# Patient Record
Sex: MALE | Race: WHITE | NOT HISPANIC OR LATINO | Employment: OTHER | URBAN - METROPOLITAN AREA
[De-identification: names, ages, dates, MRNs, and addresses within clinical notes are randomized per-mention and may not be internally consistent; named-entity substitution may affect disease eponyms.]

---

## 2017-01-06 ENCOUNTER — OFFICE VISIT (OUTPATIENT)
Dept: NEUROLOGY | Facility: CLINIC | Age: 47
End: 2017-01-06

## 2017-01-06 VITALS
DIASTOLIC BLOOD PRESSURE: 90 MMHG | HEIGHT: 72 IN | SYSTOLIC BLOOD PRESSURE: 132 MMHG | BODY MASS INDEX: 26.95 KG/M2 | WEIGHT: 199 LBS | OXYGEN SATURATION: 98 % | HEART RATE: 69 BPM

## 2017-01-06 DIAGNOSIS — G20 PARKINSON'S DISEASE (HCC): Primary | ICD-10-CM

## 2017-01-06 DIAGNOSIS — F33.1 MODERATE EPISODE OF RECURRENT MAJOR DEPRESSIVE DISORDER (HCC): ICD-10-CM

## 2017-01-06 PROCEDURE — 99213 OFFICE O/P EST LOW 20 MIN: CPT | Performed by: PSYCHIATRY & NEUROLOGY

## 2017-01-06 RX ORDER — ESCITALOPRAM OXALATE 10 MG/1
10 TABLET ORAL DAILY
Qty: 30 TABLET | Refills: 2 | Status: SHIPPED | OUTPATIENT
Start: 2017-01-06 | End: 2017-04-03 | Stop reason: SDUPTHER

## 2017-01-06 RX ORDER — PRAMIPEXOLE DIHYDROCHLORIDE 1 MG/1
1 TABLET ORAL 3 TIMES DAILY
Qty: 90 TABLET | Refills: 10 | Status: SHIPPED | OUTPATIENT
Start: 2017-01-06 | End: 2017-02-28 | Stop reason: SDUPTHER

## 2017-01-06 NOTE — MR AVS SNAPSHOT
Jose Luis Mandujano   1/6/2017 3:00 PM   Office Visit    Dept Phone:  [phone]   Encounter #:  [id number]    Provider:  Claus Louie MD   Department:  [facility] NEUROLOGY                Your Full Care Plan              Today's Medication Changes          These changes are accurate as of: 1/6/17  3:43 PM.  If you have any questions, ask your nurse or doctor.               New Medication(s)Ordered:     escitalopram 10 MG tablet   Commonly known as:  LEXAPRO   Take 1 tablet by mouth Daily.   Started by:  Claus Louie MD       pramipexole 1 MG tablet   Commonly known as:  MIRAPEX   Take 1 tablet by mouth 3 (Three) Times a Day.   Started by:  Claus Louie MD         Stop taking medication(s)listed here:     gabapentin 100 MG capsule   Commonly known as:  NEURONTIN   Stopped by:  Claus Louie MD                Where to Get Your Medications      These medications were sent to [facility] [address] [place] [address] - [phone] [place] [phone] [place] [address]     Phone:  [phone]     escitalopram 10 MG tablet    pramipexole 1 MG tablet                  Your Updated Medication List          This list is accurate as of: 1/6/17  3:43 PM.  Always use your most recent med list.                carbidopa-levodopa  MG per tablet   Commonly known as:  SINEMET   Take 1 tablet by mouth 3 (Three) Times a Day.       diazePAM 5 MG tablet   Commonly known as:  VALIUM       escitalopram 10 MG tablet   Commonly known as:  LEXAPRO   Take 1 tablet by mouth Daily.       pramipexole 1 MG tablet   Commonly known as:  MIRAPEX   Take 1 tablet by mouth 3 (Three) Times a Day.               You Were Diagnosed With        Codes Comments    Parkinson's disease    -  Primary ICD-10-CM: G20  ICD-9-CM: 332.0     Moderate episode of recurrent major depressive disorder     ICD-10-CM: F33.1  ICD-9-CM: 296.32       Instructions     "None    Patient Instructions History      Upcoming Appointments     Visit Type Date Time Department    FOLLOW UP 2017  3:00 PM [facility]      F[employer] Signup     [facility] Fuel[employer] allows you to send messages to your doctor, view your test results, renew your prescriptions, schedule appointments, and more. To sign up, go to [employer] and click on the Sign Up Now link in the New User? box. Enter your [employer] Activation Code exactly as it appears below along with the last four digits of your Social Security Number and your Date of Birth () to complete the sign-up process. If you do not sign up before the expiration date, you must request a new code.    [employer] Activation Code: [email or web address]  Expires: 2017  3:43 PM    If you have questions, you can email [email or web address] or call [phone] to talk to our [employer] staff. Remember, Fuel[employer]D is NOT to be used for urgent needs. For medical emergencies, dial 911.               Other Info from Your Visit           Allergies     No Known Allergies      Reason for Visit     Tremors F/U on MRI      Vital Signs     Blood Pressure Pulse Height Weight Oxygen Saturation Body Mass Index    132/90 69 72\" (182.9 cm) 199 lb (90.3 kg) 98% 26.99 kg/m2    Smoking Status                   Never Smoker           Problems and Diagnoses Noted     Mood problem    Parkinson disease        "

## 2017-01-06 NOTE — LETTER
January 6, 2017     Major Santana DO  [address] [facility] [address]    Patient: Jose Luis Mandujano   YOB: 1970   Date of Visit: 1/6/2017       Dear Dr. Esther DO:    Thank you for referring Jose Luis Mandujano to me for evaluation. Below are the relevant portions of my assessment and plan of care.         Problems Addressed this Visit        Nervous and Auditory    Parkinson's disease - Primary     Minimal response to Mirapex and Sinemet    Increase Mirapex 1 mg TID, and continue sinemet           Relevant Medications    pramipexole (MIRAPEX) 1 MG tablet       Other    Moderate episode of recurrent major depressive disorder     Psychological condition is worsening.  Continue current treatment regimen.  Psychological condition  will be reassessed at the next regular appointment.    Add Lexapro 10 mg qday         Relevant Medications    escitalopram (LEXAPRO) 10 MG tablet                 If you have questions, please do not hesitate to call me. I look forward to following Jose Luis along with you.         Sincerely,        Claus Louie MD        CC: No Recipients

## 2017-01-06 NOTE — PROGRESS NOTES
Subjective:     Patient ID: Jose Luis Mandujano is a 46 y.o. male.    History of Present Illness     45 yo male with PD returns in follow up.  Last visit on 12/15/16 ordered labs and MRI Brain and rx Mirapex.    Labs - [facility]  MRI Brain, my review, 12/21/16, normal    Telephoned on 12/29/16 reporting Mirapex not effective.  Rx sinement.      Update:    He has tolerated medications but does not feel any change in sx.  Left arm is stiff and difficult to move quickly.  Notices decreased arm swing.  Shoulder pain has improved by 10%.  Right ankle stiffness has improved.      Mood is anxious and increasing tremor on left side.      PMH:    Previously followed by Dr Baker and last seen by Dr aBker on 2/9/16 renewed propranolol.  Initial eval with left hand tremor for several months starting in late 2014.  Tremor has now spread to left leg and developing left sided limp.  Slowing movements of left arm and leg.      Reviewed evaluation by Dr Kit Kunz on 9/6/16   Action tremor in left hand with onset 4 years ago.  Noticed at first with smoking a cigarette.   Started on primidone on 9/6/16.  Stopped due to CNS side effects.      MRI C-spine, my review, 3/4/16 C6/7 disc to the left   The following portions of the patient's history were reviewed and updated as appropriate: allergies, current medications, past family history, past medical history, past social history, past surgical history and problem list.    Review of Systems   Constitutional: Positive for fatigue. Negative for activity change and unexpected weight change.   HENT: Negative for facial swelling, hearing loss, tinnitus, trouble swallowing and voice change.    Eyes: Negative for photophobia, pain and visual disturbance.   Respiratory: Negative for apnea, cough and choking.    Cardiovascular: Negative for chest pain.   Gastrointestinal: Negative for constipation, nausea and vomiting.   Endocrine: Negative for cold intolerance.   Genitourinary: Negative for  "difficulty urinating, frequency and urgency.   Musculoskeletal: Positive for gait problem. Negative for arthralgias, back pain, myalgias, neck pain and neck stiffness.   Skin: Negative for rash.   Allergic/Immunologic: Negative for immunocompromised state.   Neurological: Positive for tremors and weakness. Negative for dizziness, seizures, syncope, facial asymmetry, speech difficulty, light-headedness, numbness and headaches.   Hematological: Negative for adenopathy.   Psychiatric/Behavioral: Negative for confusion, decreased concentration, hallucinations and sleep disturbance. The patient is not nervous/anxious.         Objective:  Vitals:    01/06/17 1508   BP: 132/90   Pulse: 69   SpO2: 98%   Weight: 199 lb (90.3 kg)   Height: 72\" (182.9 cm)       Neurologic Exam     Mental Status   Oriented to person, place, and time.   Registration: recalls 3 of 3 objects. Recall at 5 minutes: recalls 3 of 3 objects. Follows 3 step commands.   Attention: normal. Concentration: normal.   Speech: speech is normal   Level of consciousness: alert  Knowledge: good and consistent with education.   Able to name object. Able to read. Able to repeat. Able to write. Normal comprehension.     Cranial Nerves     CN II   Visual fields full to confrontation.   Visual acuity: normal  Right visual field deficit: none  Left visual field deficit: none     CN III, IV, VI   Pupils are equal, round, and reactive to light.  Extraocular motions are normal.   Right pupil: Shape: regular. Reactivity: brisk. Consensual response: intact.   Left pupil: Shape: regular. Reactivity: brisk. Consensual response: intact.   Nystagmus: none   Diplopia: none  Ophthalmoparesis: none  Upgaze: normal  Downgaze: normal  Conjugate gaze: present  Vestibulo-ocular reflex: present    CN V   Facial sensation intact.   Right corneal reflex: normal  Left corneal reflex: normal    CN VII   Right facial weakness: none  Left facial weakness: none    CN VIII   Hearing: " intact    CN IX, X   Palate: symmetric  Right gag reflex: normal  Left gag reflex: normal    CN XI   Right sternocleidomastoid strength: normal  Left sternocleidomastoid strength: normal    CN XII   Tongue: not atrophic  Fasciculations: absent  Tongue deviation: none       Masked face and decreased blink     Motor Exam   Muscle bulk: normal  Overall muscle tone: normal  Right arm tone: normal  Left arm tone: normal and cogwheel rigidity  Right leg tone: normal  Left leg tone: increased    Strength   Strength 5/5 throughout.     Sensory Exam   Light touch normal.   Vibration normal.   Proprioception normal.   Pinprick normal.     Gait, Coordination, and Reflexes     Gait  Gait: (decreased arm swing on left )    Coordination   Romberg: negative  Finger to nose coordination: normal  Heel to shin coordination: normal  Tandem walking coordination: normal    Tremor   Resting tremor: present (left arm)  Intention tremor: present  Action tremor: left arm and left leg    Reflexes   Reflexes 2+ except as noted.        FABRICIO attenuated in left arm and leg.       Physical Exam   Constitutional: He is oriented to person, place, and time.   Eyes: EOM are normal. Pupils are equal, round, and reactive to light.   Neurological: He is oriented to person, place, and time. He has normal strength. He has a normal Finger-Nose-Finger Test, a normal Heel to Shin Test, a normal Romberg Test and a normal Tandem Gait Test.   Psychiatric: His speech is normal.       Assessment/Plan:       Problems Addressed this Visit        Nervous and Auditory    Parkinson's disease - Primary     Minimal response to Mirapex and Sinemet    Increase Mirapex 1 mg TID, and continue sinemet           Relevant Medications    pramipexole (MIRAPEX) 1 MG tablet       Other    Moderate episode of recurrent major depressive disorder     Psychological condition is worsening.  Continue current treatment regimen.  Psychological condition  will be reassessed at the next  regular appointment.    Add Lexapro 10 mg qday         Relevant Medications    escitalopram (LEXAPRO) 10 MG tablet

## 2017-01-06 NOTE — ASSESSMENT & PLAN NOTE
Psychological condition is worsening.  Continue current treatment regimen.  Psychological condition  will be reassessed at the next regular appointment.    Add Lexapro 10 mg qday

## 2017-01-20 ENCOUNTER — OFFICE VISIT (OUTPATIENT)
Dept: NEUROLOGY | Facility: CLINIC | Age: 47
End: 2017-01-20

## 2017-01-20 VITALS
HEIGHT: 72 IN | OXYGEN SATURATION: 98 % | SYSTOLIC BLOOD PRESSURE: 148 MMHG | HEART RATE: 57 BPM | DIASTOLIC BLOOD PRESSURE: 96 MMHG | WEIGHT: 197 LBS | BODY MASS INDEX: 26.68 KG/M2

## 2017-01-20 DIAGNOSIS — F33.1 MODERATE EPISODE OF RECURRENT MAJOR DEPRESSIVE DISORDER (HCC): Primary | ICD-10-CM

## 2017-01-20 DIAGNOSIS — G20 PARKINSON'S DISEASE (HCC): ICD-10-CM

## 2017-01-20 PROCEDURE — 99213 OFFICE O/P EST LOW 20 MIN: CPT | Performed by: PSYCHIATRY & NEUROLOGY

## 2017-01-20 RX ORDER — TRIHEXYPHENIDYL HYDROCHLORIDE 2 MG/1
2 TABLET ORAL 3 TIMES DAILY
Qty: 90 TABLET | Refills: 2 | Status: SHIPPED | OUTPATIENT
Start: 2017-01-20 | End: 2017-02-28 | Stop reason: SDUPTHER

## 2017-01-20 NOTE — MR AVS SNAPSHOT
Jose Luis Manudjano   1/20/2017 3:00 PM   Office Visit    Dept Phone:  [phone]   Encounter #:  [id number]    Provider:  Claus Louie MD   Department:  [facility] NEUROLOGY                Your Full Care Plan              Today's Medication Changes          These changes are accurate as of: 1/20/17  4:26 PM.  If you have any questions, ask your nurse or doctor.               New Medication(s)Ordered:     trihexyphenidyl 2 MG tablet   Commonly known as:  ARTANE   Take 1 tablet by mouth 3 (Three) Times a Day.   Started by:  Claus Louie MD         Stop taking medication(s)listed here:     diazePAM 5 MG tablet   Commonly known as:  VALIUM   Stopped by:  Claus Louie MD                Where to Get Your Medications      These medications were sent to [facility] [address] [place] [address] - [address] [place] [address] - [phone]  - [phone] [place] [id number]     Phone:  [phone]     trihexyphenidyl 2 MG tablet                  Your Updated Medication List          This list is accurate as of: 1/20/17  4:26 PM.  Always use your most recent med list.                carbidopa-levodopa  MG per tablet   Commonly known as:  SINEMET   Take 1 tablet by mouth 3 (Three) Times a Day.       escitalopram 10 MG tablet   Commonly known as:  LEXAPRO   Take 1 tablet by mouth Daily.       pramipexole 1 MG tablet   Commonly known as:  MIRAPEX   Take 1 tablet by mouth 3 (Three) Times a Day.       trihexyphenidyl 2 MG tablet   Commonly known as:  ARTANE   Take 1 tablet by mouth 3 (Three) Times a Day.               We Performed the Following     Ambulatory Referral to Physical Therapy Evaluate and treat       You Were Diagnosed With        Codes Comments    Moderate episode of recurrent major depressive disorder    -  Primary ICD-10-CM: F33.1  ICD-9-CM: 296.32     Parkinson's disease     ICD-10-CM: G20  ICD-9-CM: 332.0       Instructions     None    "   Patient Instructions History      Upcoming Appointments     Visit Type Date Time Department    FOLLOW UP 2017  3:00 PM [facility]    FOLLOW UP 2017  3:15 PM [facility]      MyChart Signup     [facility] Applyful allows you to send messages to your doctor, view your test results, renew your prescriptions, schedule appointments, and more. To sign up, go to [employer] and click on the Sign Up Now link in the New User? box. Enter your [employer] Activation Code exactly as it appears below along with the last four digits of your Social Security Number and your Date of Birth () to complete the sign-up process. If you do not sign up before the expiration date, you must request a new code.    [employer] Activation Code: [email or web address]  Expires: 2/3/2017  4:26 PM    If you have questions, you can email [email or web address] or call [phone] to talk to our Applyful staff. Remember, Applyful is NOT to be used for urgent needs. For medical emergencies, dial 911.               Other Info from Your Visit           Your Appointments     2017  3:15 PM EST   Follow Up with Claus Louie MD   [facility] (--)    [address] [place] [id number] [phone]           Arrive 15 minutes prior to appointment.              Allergies     No Known Allergies      Reason for Visit     Parkinson's Disease           Vital Signs     Blood Pressure Pulse Height Weight Oxygen Saturation Body Mass Index    148/96 57 72\" (182.9 cm) 197 lb (89.4 kg) 98% 26.72 kg/m2    Smoking Status                   Never Smoker           Problems and Diagnoses Noted     Mood problem    Parkinson disease        "

## 2017-01-20 NOTE — PROGRESS NOTES
Subjective:     Patient ID: Jose Luis Mandujano is a 46 y.o. male.    History of Present Illness     47 yo male with PD and MDD returns in follow up.  Last visit on 1/6/17 increased Mirapex 1 mg TID, continued Sinemet and added Lexapro, stopped GBP and Valium.     PD    Mild nausea with increased meds.  Gait improved but tremor continues to be bothersome.  Difficult to weld.  Stiff on left side with attenuated movements.  Doing PT on left shoulder with reduction in pain.      MDD    Slight improvement on Lexapro    PMH:    Previously followed by Dr Baker and last seen by Dr Baker on 2/9/16 renewed propranolol.  Initial eval with left hand tremor for several months starting in late 2014.  Tremor has now spread to left leg and developing left sided limp.  Slowing movements of left arm and leg.      Reviewed evaluation by Dr Kit Kunz on 9/6/16   Action tremor in left hand with onset 4 years ago.  Noticed at first with smoking a cigarette.   Started on primidone on 9/6/16.  Stopped due to CNS side effects.      MRI C-spine, my review, 3/4/16 C6/7 disc to the left   The following portions of the patient's history were reviewed and updated as appropriate: allergies, current medications, past family history, past medical history, past social history, past surgical history and problem list.    Review of Systems   Constitutional: Negative for activity change and unexpected weight change.   HENT: Negative for facial swelling, hearing loss, tinnitus, trouble swallowing and voice change.    Eyes: Negative for photophobia, pain and visual disturbance.   Respiratory: Negative for apnea and choking.    Gastrointestinal: Negative for constipation.   Endocrine: Negative for cold intolerance.   Genitourinary: Negative for difficulty urinating, frequency and urgency.   Musculoskeletal: Positive for gait problem. Negative for back pain and neck stiffness.   Allergic/Immunologic: Negative for immunocompromised state.   Neurological:  "Positive for tremors. Negative for dizziness, seizures, syncope, facial asymmetry, speech difficulty and light-headedness.   Hematological: Negative for adenopathy.   Psychiatric/Behavioral: Negative for confusion, decreased concentration, hallucinations and sleep disturbance. The patient is not nervous/anxious.         Objective:  Vitals:    01/20/17 1513   BP: 148/96   Pulse: 57   SpO2: 98%   Weight: 197 lb (89.4 kg)   Height: 72\" (182.9 cm)       Neurologic Exam     Mental Status   Oriented to person, place, and time.   Registration: recalls 3 of 3 objects. Recall at 5 minutes: recalls 3 of 3 objects. Follows 3 step commands.   Attention: normal. Concentration: normal.   Speech: speech is normal   Level of consciousness: alert  Knowledge: good and consistent with education.   Able to name object. Able to read. Able to repeat. Able to write. Normal comprehension.     Cranial Nerves     CN II   Visual fields full to confrontation.   Visual acuity: normal  Right visual field deficit: none  Left visual field deficit: none     CN III, IV, VI   Pupils are equal, round, and reactive to light.  Extraocular motions are normal.   Right pupil: Shape: regular. Reactivity: brisk. Consensual response: intact.   Left pupil: Shape: regular. Reactivity: brisk. Consensual response: intact.   Nystagmus: none   Diplopia: none  Ophthalmoparesis: none  Upgaze: normal  Downgaze: normal  Conjugate gaze: present  Vestibulo-ocular reflex: present    CN V   Facial sensation intact.   Right corneal reflex: normal  Left corneal reflex: normal    CN VII   Right facial weakness: none  Left facial weakness: none    CN VIII   Hearing: intact    CN IX, X   Palate: symmetric  Right gag reflex: normal  Left gag reflex: normal    CN XI   Right sternocleidomastoid strength: normal  Left sternocleidomastoid strength: normal    CN XII   Tongue: not atrophic  Fasciculations: absent  Tongue deviation: none       Masked face and decreased blink     Motor " Exam   Muscle bulk: normal  Overall muscle tone: normal  Right arm tone: normal  Left arm tone: normal and cogwheel rigidity  Right leg tone: normal  Left leg tone: increased    Strength   Strength 5/5 throughout.     Sensory Exam   Light touch normal.   Vibration normal.   Proprioception normal.   Pinprick normal.     Gait, Coordination, and Reflexes     Gait  Gait: (decreased arm swing on left )    Coordination   Romberg: negative  Finger to nose coordination: normal  Heel to shin coordination: normal  Tandem walking coordination: normal    Tremor   Resting tremor: present (left arm)  Intention tremor: present  Action tremor: left arm and left leg    Reflexes   Reflexes 2+ except as noted.        FABRICIO attenuated in left arm and leg.       Physical Exam   Constitutional: He is oriented to person, place, and time.   Eyes: EOM are normal. Pupils are equal, round, and reactive to light.   Neurological: He is oriented to person, place, and time. He has normal strength. He has a normal Finger-Nose-Finger Test, a normal Heel to Shin Test, a normal Romberg Test and a normal Tandem Gait Test.   Psychiatric: His speech is normal.       Assessment/Plan:       Problems Addressed this Visit        Nervous and Auditory    Parkinson's disease     Tremor is persistent and bothersome    Add Artane 2 mg TID  Continue Mirapex/Sinemet              Relevant Medications    trihexyphenidyl (ARTANE) 2 MG tablet       Other    Moderate episode of recurrent major depressive disorder - Primary     Psychological condition is unchanged.  Continue current treatment regimen.  Psychological condition  will be reassessed at the next regular appointment.

## 2017-01-20 NOTE — ASSESSMENT & PLAN NOTE
Psychological condition is unchanged.  Continue current treatment regimen.  Psychological condition  will be reassessed at the next regular appointment.

## 2017-01-20 NOTE — LETTER
January 20, 2017     Major Santana DO  [address] [facility] [address]    Patient: Jose Luis Mandujano   YOB: 1970   Date of Visit: 1/20/2017       Dear Dr. Esther DO:    Thank you for referring Jose Luis Mandujano to me for evaluation. Below are the relevant portions of my assessment and plan of care.         Problems Addressed this Visit        Nervous and Auditory    Parkinson's disease     Tremor is persistent and bothersome    Add Artane 2 mg TID  Continue Mirapex/Sinemet              Relevant Medications    trihexyphenidyl (ARTANE) 2 MG tablet       Other    Moderate episode of recurrent major depressive disorder - Primary     Psychological condition is unchanged.  Continue current treatment regimen.  Psychological condition  will be reassessed at the next regular appointment.                      If you have questions, please do not hesitate to call me. I look forward to following Jose Luis along with you.         Sincerely,        Claus Louie MD        CC: No Recipients

## 2017-01-31 ENCOUNTER — TRANSCRIBE ORDERS (OUTPATIENT)
Dept: PHYSICAL THERAPY | Facility: HOSPITAL | Age: 47
End: 2017-01-31

## 2017-01-31 ENCOUNTER — HOSPITAL ENCOUNTER (OUTPATIENT)
Dept: PHYSICAL THERAPY | Facility: HOSPITAL | Age: 47
Setting detail: THERAPIES SERIES
Discharge: HOME | End: 2017-01-31
Attending: PSYCHIATRY & NEUROLOGY

## 2017-01-31 DIAGNOSIS — G20 PARKINSON'S DISEASE (HCC): Primary | ICD-10-CM

## 2017-01-31 PROCEDURE — 97161 PT EVAL LOW COMPLEX 20 MIN: CPT

## 2017-01-31 PROCEDURE — 97112 NEUROMUSCULAR REEDUCATION: CPT

## 2017-02-14 ENCOUNTER — HOSPITAL ENCOUNTER (OUTPATIENT)
Dept: OCCUPATIONAL THERAPY | Facility: HOSPITAL | Age: 47
Setting detail: THERAPIES SERIES
Discharge: HOME | End: 2017-02-14
Attending: PSYCHIATRY & NEUROLOGY

## 2017-02-14 DIAGNOSIS — Z78.9 IMPAIRED MOBILITY AND ADLS: ICD-10-CM

## 2017-02-14 DIAGNOSIS — R27.8 DECREASED COORDINATION: Primary | ICD-10-CM

## 2017-02-14 DIAGNOSIS — Z74.09 IMPAIRED MOBILITY AND ADLS: ICD-10-CM

## 2017-02-14 DIAGNOSIS — R25.1 TREMOR OF LEFT HAND: ICD-10-CM

## 2017-02-14 PROCEDURE — 97165 OT EVAL LOW COMPLEX 30 MIN: CPT | Performed by: OCCUPATIONAL THERAPIST

## 2017-02-14 PROCEDURE — 97530 THERAPEUTIC ACTIVITIES: CPT | Performed by: OCCUPATIONAL THERAPIST

## 2017-02-14 NOTE — PROGRESS NOTES
Outpatient Occupational Therapy Neuro Initial Evaluation  [facility]     Patient Name: Jose Luis Mandujano  : 1970  MRN: [id number]  Today's Date: 2017      Visit Date: 2017    Patient Active Problem List   Diagnosis   • Parkinson's disease   • Moderate episode of recurrent major depressive disorder        Past Medical History   Diagnosis Date   • Benign familial tremor    • Tremor         History reviewed. No pertinent past surgical history.      Visit Dx:      ICD-10-CM ICD-9-CM   1. Decreased coordination R27.9 781.3   2. Tremor of left hand R25.1 781.0   3. Impaired mobility and ADLs Z74.09 799.89             Patient History       17 1500          History    Chief Complaint Difficulty with daily activities;Joint stiffness;Other 1 (comment)   tremor/coordination  -SK      Date Current Problem(s) Began 16  -SK      Brief Description of Current Complaint Mr. Mandujano presents to OP OT upon the recommendation of his PT. He has been experiencing L sided tremors that impact his work as a  at [employer]. Although he is right-handed, Mr. Mandujano is required to use his left hand to assist and complete many tasks left handed at work.   -SK      Patient/Caregiver Goals Return to prior level of function;Know what to do to help the symptoms  -SK      Current Tobacco Use no  -SK      Smoking Status no  -SK      Patient's Rating of General Health Good  -SK      Hand Dominance right-handed  -SK      Pain     Pain at Present 0  -SK      Pain at Best 0  -SK      Pain at Worst 0  -SK      Fall Risk Assessment    Any falls in the past year: No  -SK      Daily Activities    Primary Language English  -SK      Patient is concerned about/has problems with Coordination;Grasping objects lifting;Performing job responsibilities/community activities (work, school,;Writing/grasping items with hand(s)  -SK      Barriers to learning None  -SK      Pt Participated in POC and Goals Yes  -SK      Safety     Are you being hurt, hit, or frightened by anyone at home or in your life? No  -SK      Are you being neglected by a caregiver No  -SK        User Key  (r) = Recorded By, (t) = Taken By, (c) = Cosigned By    Initials Name Provider Type    TODD Tejeda Occupational Therapist                OT Neuro       02/14/17 1500          Subjective Comments    Subjective Comments My hand is so frustrating  -SK      Subjective Pain    Able to rate subjective pain? yes  -SK      Pre-Treatment Pain Level 0  -SK      Post-Treatment Pain Level 0  -SK      Home Living    Living Arrangements house  -SK      Home Accessibility no concerns  -SK      Living Environment Comment pt lives and works independently as a  at [employer]   -SK      Vision- Basic    Current Vision Wears glasses all the time  -SK      Sensation    Sensation WNL? WNL  -SK      Light Touch No apparent deficits  -SK      Sharp/Dull No apparent deficits  -SK      Coordination    Resting Tremor Left:;Yes  -SK      Intentional Tremor Left:;Yes  -SK      Other Coordination Observations tremor worse when anxiety increases  -SK      ROM (Range of Motion)    General ROM upper extremity range of motion deficits identified  -SK      General UE Assessment    ROM Detail wrist f/e 80/61 L   -SK      MMT (Manual Muscle Testing)    General MMT Assessment no strength deficits identified  -SK        User Key  (r) = Recorded By, (t) = Taken By, (c) = Cosigned By    Initials Name Provider Type    TODD Tejeda Occupational Therapist             Hand Therapy (last 24 hours)      Hand Eval       02/14/17 1500           Strength Right    Right  Test 1 120  -SK      Right  Test 2 110  -SK      Right  Test 3 120  -SK       Strength Average Right 116.67  -SK       Strength Left    Left  Test 1 95  -SK      Left  Test 2 92  -SK      Left  Test 3 90  -SK       Strength Average Left 92.33  -SK      Therapy  Education    Given HEP  -SK      Program New  -SK      How Provided Verbal;Demonstration;Written  -SK      Provided to Patient  -SK      Level of Understanding Teach back education performed;Verbalized;Demonstrated  -SK        User Key  (r) = Recorded By, (t) = Taken By, (c) = Cosigned By    Initials Name Provider Type    JOSELUIS Gaviria OTR Occupational Therapist                        OT Goals       02/14/17 1700 02/14/17 1500       OT Short Term Goals    STG 1  Pt will be independent with L wrist stretching protocol by 2 wks to increase flexibility for work related tasks.  -SK     STG 1 Progress  New  -SK     STG 2  Pt will be independent with L hand strengthening HEP to increase  strength during fxnl tasks.  -SK     STG 2 Progress  New  -SK     Long Term Goals    LTG 1  Pt will improve L 9HPT score by 1 second by d/c to demonstrate increased speed and accuracy w/FMC.  -SK     LTG 1 Progress  New  -SK     LTG 2  Pt will be independent with home/work mods to aid with tremor activity to improve satifsfaction with daily tasks by d/c.   -SK     LTG 2 Progress  New  -SK     Time Calculation    OT Goal Re-Cert Due Date 03/16/17  -SK 03/16/17  -SK       User Key  (r) = Recorded By, (t) = Taken By, (c) = Cosigned By    Initials Name Provider Type    TODD Tejeda Occupational Therapist                OT Assessment/Plan       02/14/17 1656          OT Assessment    Functional Limitations Performance in work activities;Performance in leisure activities  -SK      Impairments Coordination;Dexterity;Muscle strength;Range of motion  -SK      Assessment Comments OT IE completed per consult. Pt to benefit from skilled OT intervention to address L wrist extension, FMC speed/accuracy and home/activity modifications to improve functional engagement in daily tasks.   -SK      Please refer to paper survey for additional self-reported information Yes  -SK      OT Rehab Potential Good  -SK      Patient would benefit  from skilled therapy intervention Yes  -SK      OT Plan    OT Frequency 1x/week  -SK      Predicted Duration of Therapy Intervention (days/wks) 4 wks  -SK      Planned CPT's? OT EVAL: 79281;OT THER ACT EA 15 MIN: 39828PM;OT THER PROC EA 15 MIN: 93496FT;OT NEUROMUSC RE EDUCATION EA 15 MIN: 85127;OT SELF CARE/MGMT/TRAIN 15 MIN: 15238  -SK      Planned Therapy Interventions (Optional Details) home exercise program;motor coordination training;neuromuscular re-education;patient/family education;ROM (Range of Motion);strengthening;stretching  -SK      OT Plan Comments est. OT POC 1x/week X4 wks   -SK        User Key  (r) = Recorded By, (t) = Taken By, (c) = Cosigned By    Initials Name Provider Type    JOSELUIS Gaviria OTR Occupational Therapist                OT Exercises       02/14/17 1500          Exercise 1    Exercise Name 1 wrist f/e stretching; see written protocol for details  -SK      Exercise 2    Exercise Name 2 reviewed LSVT BIG! protocol previously issued by PT  -SK        User Key  (r) = Recorded By, (t) = Taken By, (c) = Cosigned By    Initials Name Provider Type    TODD Tejeda Occupational Therapist                    Outcome Measures       02/14/17 1500          9 Hole Peg    9-Hole Peg Left 22.93  -SK      9-Hole Peg Right 18.53  -SK      Purdue Pegboard    Left Hand (30 seconds) 11  -SK      Right Hand (30 seconds) 13  -SK      Both Hands (30 seconds) 8  -SK      Right + Left + Both Hands 32  -SK      Assembly (60 seconds) 23  -SK      Functional Assessment    Outcome Measure Options 9 Hole Peg;Purdue Peg Board  -SK        User Key  (r) = Recorded By, (t) = Taken By, (c) = Cosigned By    Initials Name Provider Type    JOSELUIS Gaviria OTR Occupational Therapist            Time Calculation:   OT Start Time: 1500  Total Timed Code Minutes- OT: 15 minute(s)     Therapy Charges for Today     Code Description Service Date Service Provider Modifiers Qty    07860685660  OT EVAL LOW  COMPLEXITY 3 2/14/2017 TODD Castle GO 1    [id number]  OT THERAPEUTIC ACT EA 15 MIN 2/14/2017 TODD Castle GO 1                     TODD Ventura  2/14/2017

## 2017-02-21 ENCOUNTER — HOSPITAL ENCOUNTER (OUTPATIENT)
Dept: OCCUPATIONAL THERAPY | Facility: HOSPITAL | Age: 47
Setting detail: THERAPIES SERIES
Discharge: HOME | End: 2017-02-21
Attending: PSYCHIATRY & NEUROLOGY

## 2017-02-21 DIAGNOSIS — Z74.09 IMPAIRED MOBILITY AND ADLS: ICD-10-CM

## 2017-02-21 DIAGNOSIS — Z78.9 IMPAIRED MOBILITY AND ADLS: ICD-10-CM

## 2017-02-21 DIAGNOSIS — R27.8 DECREASED COORDINATION: Primary | ICD-10-CM

## 2017-02-21 DIAGNOSIS — R25.1 TREMOR OF LEFT HAND: ICD-10-CM

## 2017-02-21 PROCEDURE — 97535 SELF CARE MNGMENT TRAINING: CPT | Performed by: OCCUPATIONAL THERAPIST

## 2017-02-21 PROCEDURE — 97112 NEUROMUSCULAR REEDUCATION: CPT | Performed by: OCCUPATIONAL THERAPIST

## 2017-02-21 NOTE — PROGRESS NOTES
Outpatient Occupational Therapy Neuro Treatment Note  [facility]     Patient Name: Jose Luis Mandujano  : 1970  MRN: [id number]  Today's Date: 2017       Visit Date: 2017    Patient Active Problem List   Diagnosis   • Parkinson's disease   • Moderate episode of recurrent major depressive disorder        Past Medical History   Diagnosis Date   • Benign familial tremor    • Tremor         No past surgical history on file.      Visit Dx:    ICD-10-CM ICD-9-CM   1. Decreased coordination R27.9 781.3   2. Tremor of left hand R25.1 781.0   3. Impaired mobility and ADLs Z74.09 799.89                             OT Goals       17 1600 17 1700 17 1500    OT Short Term Goals    STG 1 Pt will be independent with L wrist stretching protocol by 2 wks to increase flexibility for work related tasks.  -ST  Pt will be independent with L wrist stretching protocol by 2 wks to increase flexibility for work related tasks.  -SK    STG 1 Progress New  -  New  -SK    STG 2 Pt will be independent with L hand strengthening HEP to increase  strength during fxnl tasks.  -ST  Pt will be independent with L hand strengthening HEP to increase  strength during fxnl tasks.  -SK    STG 2 Progress New  -[facility]    Long Term Goals    LTG 1 Pt will improve L 9HPT score by 1 second by d/c to demonstrate increased speed and accuracy w/FMC.  -ST  Pt will improve L 9HPT score by 1 second by d/c to demonstrate increased speed and accuracy w/FMC.  -SK    LTG 1 Progress New  -  New  -SK    LTG 2 Pt will be independent with home/work mods to aid with tremor activity to improve satifsfaction with daily tasks by d/c.   -ST  Pt will be independent with home/work mods to aid with tremor activity to improve satifsfaction with daily tasks by d/c.   -SK    LTG 2 Progress New  [facility]    Time Calculation    OT Goal Re-Cert Due Date  17  -SK 17  -SK      User Key  (r) = Recorded By, (t) = Taken By, (c)  "= Cosigned By    Initials Name Provider Type    JOSELUIS Gaviria OTR Occupational Therapist     TODD Duncan Occupational Therapist                Therapy Education       02/21/17 1722    Therapy Education    Given HEP  -ST    Program New  -ST    How Provided Verbal;Demonstration;Written  -ST    Provided to Patient  -ST    Level of Understanding Teach back education performed;Verbalized;Demonstrated  -ST      User Key  (r) = Recorded By, (t) = Taken By, (c) = Cosigned By    Initials Name Provider Type    TODD Gill Occupational Therapist                    OT Exercises       02/21/17 1600          Subjective Comments    Subjective Comments pt states, \"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 yes  -ST      Pre-Treatment Pain Level 0  -ST      Post-Treatment Pain Level 0  -ST      Exercise 1    Exercise Name 1 L hand strengthening HEP using blue firm t-putty (power grasp) & see written protocol for details  -ST      Exercise 2    Exercise Name 2 power grasp strengthening using blue foam block, see written protocol  -ST      Exercise 3    Exercise Name 3 addressing tremor in varying plns (standing/sitting-LUE elevated in air then supported)  -ST      Exercise 4    Exercise Name 4 completed simulated welding task for carryover at work, focus on L wrist flexion w/pressure to aid in assist of welding task and decreasing tremor  -ST      Exercise 5    Exercise Name 5 reviewed BIG movements to aid with breaking up tremor tasks during grooming and work related activities   -ST        User Key  (r) = Recorded By, (t) = Taken By, (c) = Cosigned By    Initials Name Provider Type    TODD Gill Occupational Therapist                    Time Calculation:   OT Start Time: 1600  Total Timed Code Minutes- OT: 55 minute(s)     Therapy Charges for Today     Code Description Service Date Service Provider Modifiers Qty    72930198077 HC OT " NEUROMUSC RE EDUCATION EA 15 MIN 2/21/2017 TODD Duncan GO 3    70991335337 HC OT SELF CARE/MGMT/TRAIN EA 15 MIN 2/21/2017 TODD Duncan GO 1                    TODD Garcia  2/21/2017

## 2017-02-28 ENCOUNTER — OFFICE VISIT (OUTPATIENT)
Dept: NEUROLOGY | Facility: CLINIC | Age: 47
End: 2017-02-28

## 2017-02-28 VITALS
WEIGHT: 194.6 LBS | BODY MASS INDEX: 26.36 KG/M2 | OXYGEN SATURATION: 98 % | SYSTOLIC BLOOD PRESSURE: 132 MMHG | HEART RATE: 62 BPM | HEIGHT: 72 IN | DIASTOLIC BLOOD PRESSURE: 90 MMHG

## 2017-02-28 DIAGNOSIS — G20 PARKINSON'S DISEASE (HCC): Primary | ICD-10-CM

## 2017-02-28 DIAGNOSIS — F33.1 MODERATE EPISODE OF RECURRENT MAJOR DEPRESSIVE DISORDER (HCC): ICD-10-CM

## 2017-02-28 PROCEDURE — 99213 OFFICE O/P EST LOW 20 MIN: CPT | Performed by: PSYCHIATRY & NEUROLOGY

## 2017-02-28 RX ORDER — TRIHEXYPHENIDYL HYDROCHLORIDE 2 MG/1
4 TABLET ORAL 3 TIMES DAILY
Qty: 180 TABLET | Refills: 11 | Status: SHIPPED | OUTPATIENT
Start: 2017-02-28 | End: 2018-02-28 | Stop reason: SDUPTHER

## 2017-02-28 RX ORDER — PRAMIPEXOLE DIHYDROCHLORIDE 1.5 MG/1
1.5 TABLET ORAL 3 TIMES DAILY
Qty: 90 TABLET | Refills: 11 | Status: SHIPPED | OUTPATIENT
Start: 2017-02-28 | End: 2018-02-28 | Stop reason: SDUPTHER

## 2017-02-28 NOTE — PROGRESS NOTES
Subjective:     Patient ID: Jose Luis Mandujano is a 46 y.o. male.    History of Present Illness     47 yo male with PD and MDD returns in follow up.  Last visit on 1/20/17 continued Mirapex 1 mg TID, Sinemet and Lexapro, added Artane.     PD    Pt is taking first two dosages of medications at 3:30 am and 4:30 pm.  PT eval was unremarkable.  Currently doing OT to help with arms and hand tremors.  Left shoulder stiffness and left leg limp have resolved.     Gait improved but tremor continues to be bothersome.  Difficult to weld.       MDD    Mood improved on Lexapro    PMH:    Previously followed by Dr Baker and last seen by Dr Baker on 2/9/16 renewed propranolol.  Initial eval with left hand tremor for several months starting in late 2014.  Tremor has now spread to left leg and developing left sided limp.  Slowing movements of left arm and leg.      Reviewed evaluation by Dr Kit Kunz on 9/6/16   Action tremor in left hand with onset 4 years ago.  Noticed at first with smoking a cigarette.   Started on primidone on 9/6/16.  Stopped due to CNS side effects.      MRI C-spine, my review, 3/4/16 C6/7 disc to the left   The following portions of the patient's history were reviewed and updated as appropriate: allergies, current medications, past family history, past medical history, past social history, past surgical history and problem list.    Review of Systems   Constitutional: Positive for fatigue. Negative for activity change and unexpected weight change.   HENT: Negative for facial swelling, hearing loss, tinnitus, trouble swallowing and voice change.    Eyes: Negative for photophobia, pain and visual disturbance.   Respiratory: Negative for apnea and choking.    Gastrointestinal: Negative for constipation.   Endocrine: Negative for cold intolerance.   Genitourinary: Negative for difficulty urinating, frequency and urgency.   Musculoskeletal: Positive for gait problem. Negative for back pain and neck stiffness.  "  Allergic/Immunologic: Negative for immunocompromised state.   Neurological: Positive for tremors. Negative for dizziness, seizures, syncope, facial asymmetry, speech difficulty and light-headedness.   Hematological: Negative for adenopathy.   Psychiatric/Behavioral: Negative for confusion, decreased concentration, hallucinations and sleep disturbance. The patient is nervous/anxious.         Objective:  Vitals:    02/28/17 1503   BP: 132/90   Pulse: 62   SpO2: 98%   Weight: 194 lb 9.6 oz (88.3 kg)   Height: 72\" (182.9 cm)       Neurologic Exam     Mental Status   Oriented to person, place, and time.   Registration: recalls 3 of 3 objects. Recall at 5 minutes: recalls 3 of 3 objects. Follows 3 step commands.   Attention: normal. Concentration: normal.   Speech: speech is normal   Level of consciousness: alert  Knowledge: good and consistent with education.   Able to name object. Able to read. Able to repeat. Able to write. Normal comprehension.     Cranial Nerves     CN II   Visual fields full to confrontation.   Visual acuity: normal  Right visual field deficit: none  Left visual field deficit: none     CN III, IV, VI   Pupils are equal, round, and reactive to light.  Extraocular motions are normal.   Right pupil: Shape: regular. Reactivity: brisk. Consensual response: intact.   Left pupil: Shape: regular. Reactivity: brisk. Consensual response: intact.   Nystagmus: none   Diplopia: none  Ophthalmoparesis: none  Upgaze: normal  Downgaze: normal  Conjugate gaze: present  Vestibulo-ocular reflex: present    CN V   Facial sensation intact.   Right corneal reflex: normal  Left corneal reflex: normal    CN VII   Right facial weakness: none  Left facial weakness: none    CN VIII   Hearing: intact    CN IX, X   Palate: symmetric  Right gag reflex: normal  Left gag reflex: normal    CN XI   Right sternocleidomastoid strength: normal  Left sternocleidomastoid strength: normal    CN XII   Tongue: not " atrophic  Fasciculations: absent  Tongue deviation: none       Masked face and decreased blink     Motor Exam   Muscle bulk: normal  Overall muscle tone: normal  Right arm tone: normal  Left arm tone: normal and cogwheel rigidity  Right leg tone: normal  Left leg tone: increased    Strength   Strength 5/5 throughout.     Sensory Exam   Light touch normal.   Vibration normal.   Proprioception normal.   Pinprick normal.     Gait, Coordination, and Reflexes     Gait  Gait: (decreased arm swing on left )    Coordination   Romberg: negative  Finger to nose coordination: normal  Heel to shin coordination: normal  Tandem walking coordination: normal    Tremor   Resting tremor: present (left arm)  Intention tremor: present  Action tremor: left arm and left leg    Reflexes   Reflexes 2+ except as noted.        FABRICIO attenuated in left arm and leg.       Physical Exam   Constitutional: He is oriented to person, place, and time.   Eyes: EOM are normal. Pupils are equal, round, and reactive to light.   Neurological: He is oriented to person, place, and time. He has normal strength. He has a normal Finger-Nose-Finger Test, a normal Heel to Shin Test, a normal Romberg Test and a normal Tandem Gait Test.   Psychiatric: His speech is normal.       Assessment/Plan:       Problems Addressed this Visit        Nervous and Auditory    Parkinson's disease - Primary     Increase Mirapex 1.5 mg TID; Sinemet 25/100 2 tabs po TID, Artane 4 mg TID         Relevant Medications    trihexyphenidyl (ARTANE) 2 MG tablet    pramipexole (MIRAPEX) 1.5 MG tablet    carbidopa-levodopa (SINEMET)  MG per tablet       Other    Moderate episode of recurrent major depressive disorder     Stable on Lexapro 10 mg qday

## 2017-03-16 ENCOUNTER — HOSPITAL ENCOUNTER (OUTPATIENT)
Dept: OCCUPATIONAL THERAPY | Facility: HOSPITAL | Age: 47
Setting detail: THERAPIES SERIES
Discharge: HOME | End: 2017-03-16
Attending: PSYCHIATRY & NEUROLOGY

## 2017-03-16 DIAGNOSIS — R27.8 DECREASED COORDINATION: Primary | ICD-10-CM

## 2017-03-16 DIAGNOSIS — Z74.09 IMPAIRED MOBILITY AND ADLS: ICD-10-CM

## 2017-03-16 DIAGNOSIS — Z78.9 IMPAIRED MOBILITY AND ADLS: ICD-10-CM

## 2017-03-16 DIAGNOSIS — R25.1 TREMOR OF LEFT HAND: ICD-10-CM

## 2017-03-16 PROCEDURE — 97112 NEUROMUSCULAR REEDUCATION: CPT | Performed by: OCCUPATIONAL THERAPIST

## 2017-03-16 NOTE — THERAPY DISCHARGE NOTE
Outpatient Occupational Therapy Neuro Treatment Note/Discharge Summary  [facility]     Patient Name: Jose Luis Mandujano  : 1970  MRN: [id number]  Today's Date: 3/16/2017      Visit Date: 2017    Patient Active Problem List   Diagnosis   • Parkinson's disease   • Moderate episode of recurrent major depressive disorder        Past Medical History   Diagnosis Date   • Benign familial tremor    • Tremor         No past surgical history on file.      Visit Dx:    ICD-10-CM ICD-9-CM   1. Decreased coordination R27.9 781.3   2. Tremor of left hand R25.1 781.0   3. Impaired mobility and ADLs Z74.09 799.89              Hand Therapy (last 24 hours)      Hand Eval       17 1500           Strength Left    Left  Test 1 97  -ST      Left  Test 2 100  -ST      Left  Test 3 99  -ST       Strength Average Left 98.67  -ST      Therapy Education    Given HEP  -ST      Program New  -ST      How Provided Verbal;Demonstration  -ST      Provided to Patient  -ST      Level of Understanding Teach back education performed;Verbalized;Demonstrated  -ST        User Key  (r) = Recorded By, (t) = Taken By, (c) = Cosigned By    Initials Name Provider Type    ST Deb Ferreira, OTR Occupational Therapist                    OT Assessment/Plan       17 1705       OT Assessment    Assessment Comments OT re-assessment completed per POC; pt met all goals and is independent with HEPs and home/activity mods to aid with tremor activity. Will d/c at this time.   -ST     Please refer to paper survey for additional self-reported information Yes  -ST     OT Plan    OT Plan Comments d/c skilled OT intervention at this time; pt to continue with HEPs and home/activity mods as educated  -ST       User Key  (r) = Recorded By, (t) = Taken By, (c) = Cosigned By    Initials Name Provider Type    ST Deb Ferreira OTR Occupational Therapist                 OT Goals       17 1500       OT Short Term Goals    STG 1 Pt  will be independent with L wrist stretching protocol by 2 wks to increase flexibility for work related tasks.  -ST     STG 1 Progress Met  -ST     STG 2 Pt will be independent with L hand strengthening HEP to increase  strength during fxnl tasks.  -ST     STG 2 Progress Met  -ST     Long Term Goals    LTG 1 Pt will improve L 9HPT score by 1 second by d/c to demonstrate increased speed and accuracy w/FMC.  -ST     LTG 1 Progress Met  -ST     LTG 2 Pt will be independent with home/work mods to aid with tremor activity to improve satifsfaction with daily tasks by d/c.   -ST     LTG 2 Progress Met  -ST       User Key  (r) = Recorded By, (t) = Taken By, (c) = Cosigned By    Initials Name Provider Type    ST Deb Ferreira OTR Occupational Therapist                        OT Exercises       03/16/17 1500          Subjective Comments    Subjective Comments The tremor seems to have moved into my leg some  -ST      Subjective Pain    Able to rate subjective pain? yes  -ST      Pre-Treatment Pain Level 0  -ST      Post-Treatment Pain Level 0  -ST      Exercise 1    Exercise Name 1 OT re-assessment completed per POC; see flowsheet for details  -ST      Exercise 2    Exercise Name 2 completed scissor cutting using L hand then R hand, stabilizing with L hand on table top then suspended in air; when suspended in air, slight tremor noted, however able to control activity when elbow fully extended  -ST      Exercise 3    Exercise Name 3 completed fxnl mobility in clinic, focusing on arm swing and avoiding tremor in thumb  -ST      Exercise 4    Exercise Name 4 completed FMC tasks involving olive grabber (required to use power grasp to hold to avoid tremor); then used tweezers to retrieve small items from binq  -ST      Exercise 5    Exercise Name 5 Completed FMC task using notched pegs to place in proper orinted holes on table top then suspended in air   -ST        User Key  (r) = Recorded By, (t) = Taken By, (c) = Cosigned  By    Initials Name Provider Type    TODD Gill Occupational Therapist                    Outcome Measures       03/16/17 1500          9 Hole Peg    9-Hole Peg Left 20.58  -ST      9-Hole Peg Right 18.33  -ST        User Key  (r) = Recorded By, (t) = Taken By, (c) = Cosigned By    Initials Name Provider Type    TODD Gill Occupational Therapist            Time Calculation:   OT Start Time: 1500  Total Timed Code Minutes- OT: 58 minute(s)     Therapy Charges for Today     Code Description Service Date Service Provider Modifiers Qty    01557008421  OT NEUROMUSC RE EDUCATION EA 15 MIN 3/16/2017 TODD Duncan GO 4                OP OT Discharge Summary  Date of Discharge: 03/16/17  Reason for Discharge: All goals achieved  Outcomes Achieved: Refer to plan of care for updates on goals achieved  Discharge Destination: Home with home program        TODD Garcia  3/16/2017

## 2017-04-03 RX ORDER — ESCITALOPRAM OXALATE 10 MG/1
TABLET ORAL
Qty: 30 TABLET | Refills: 6 | Status: SHIPPED | OUTPATIENT
Start: 2017-04-03 | End: 2017-04-25 | Stop reason: SDUPTHER

## 2017-04-25 ENCOUNTER — OFFICE VISIT (OUTPATIENT)
Dept: NEUROLOGY | Facility: CLINIC | Age: 47
End: 2017-04-25

## 2017-04-25 VITALS
OXYGEN SATURATION: 98 % | WEIGHT: 204.2 LBS | HEART RATE: 62 BPM | SYSTOLIC BLOOD PRESSURE: 136 MMHG | DIASTOLIC BLOOD PRESSURE: 88 MMHG | HEIGHT: 72 IN | BODY MASS INDEX: 27.66 KG/M2

## 2017-04-25 DIAGNOSIS — G20 PARKINSON'S DISEASE (HCC): Primary | ICD-10-CM

## 2017-04-25 DIAGNOSIS — F33.1 MODERATE EPISODE OF RECURRENT MAJOR DEPRESSIVE DISORDER (HCC): ICD-10-CM

## 2017-04-25 PROCEDURE — 99213 OFFICE O/P EST LOW 20 MIN: CPT | Performed by: PSYCHIATRY & NEUROLOGY

## 2017-04-25 RX ORDER — ESCITALOPRAM OXALATE 10 MG/1
20 TABLET ORAL DAILY
Qty: 60 TABLET | Refills: 11 | Status: SHIPPED | OUTPATIENT
Start: 2017-04-25 | End: 2017-08-28

## 2017-04-25 NOTE — ASSESSMENT & PLAN NOTE
Psychological condition is improving with treatment.  Medication changes per orders.  Psychological condition  will be reassessed at the next regular appointment.

## 2017-04-25 NOTE — PROGRESS NOTES
Subjective:     Patient ID: Jose Luis Mandujano is a 46 y.o. male.    History of Present Illness     47 yo male with PD and MDD returns in follow up.  Last visit on 2/28/17 increased Mirapex 1.5 mg TID, continued Sinemet and Lexapro, Artane.     PD    Left arm tremor improved.  Left quad has pulsing sensation.  Exercising daily.    Left shoulder stiffness improved.  Left leg limp have resolved.        MDD    Mild anxiety but significant improvement in Lexapro, does have trouble in public settings.     PMH:    Previously followed by Dr Baker and last seen by Dr Baker on 2/9/16 renewed propranolol.  Initial eval with left hand tremor for several months starting in late 2014.  Tremor has now spread to left leg and developing left sided limp.  Slowing movements of left arm and leg.      Reviewed evaluation by Dr Kit Kunz on 9/6/16   Action tremor in left hand with onset 4 years ago.  Noticed at first with smoking a cigarette.   Started on primidone on 9/6/16.  Stopped due to CNS side effects.      MRI C-spine, my review, 3/4/16 C6/7 disc to the left   The following portions of the patient's history were reviewed and updated as appropriate: allergies, current medications, past family history, past medical history, past social history, past surgical history and problem list.    Review of Systems   Constitutional: Negative for activity change and unexpected weight change.   HENT: Negative for facial swelling, hearing loss, tinnitus, trouble swallowing and voice change.    Eyes: Negative for photophobia, pain and visual disturbance.   Respiratory: Negative for apnea and choking.    Gastrointestinal: Negative for constipation.   Endocrine: Negative for cold intolerance.   Genitourinary: Negative for difficulty urinating, frequency and urgency.   Musculoskeletal: Positive for gait problem. Negative for back pain and neck stiffness.   Allergic/Immunologic: Negative for immunocompromised state.   Neurological: Positive for  "tremors. Negative for dizziness, seizures, syncope, facial asymmetry, speech difficulty and light-headedness.   Hematological: Negative for adenopathy.   Psychiatric/Behavioral: Negative for confusion, decreased concentration, hallucinations and sleep disturbance. The patient is nervous/anxious.         Objective:  Vitals:    04/25/17 1505   BP: 136/88   Pulse: 62   SpO2: 98%   Weight: 204 lb 3.2 oz (92.6 kg)   Height: 72\" (182.9 cm)       Neurologic Exam     Mental Status   Oriented to person, place, and time.   Registration: recalls 3 of 3 objects. Recall at 5 minutes: recalls 3 of 3 objects. Follows 3 step commands.   Attention: normal. Concentration: normal.   Speech: speech is normal   Level of consciousness: alert  Knowledge: good and consistent with education.   Able to name object. Able to read. Able to repeat. Able to write. Normal comprehension.     Cranial Nerves     CN II   Visual fields full to confrontation.   Visual acuity: normal  Right visual field deficit: none  Left visual field deficit: none     CN III, IV, VI   Pupils are equal, round, and reactive to light.  Extraocular motions are normal.   Right pupil: Shape: regular. Reactivity: brisk. Consensual response: intact.   Left pupil: Shape: regular. Reactivity: brisk. Consensual response: intact.   Nystagmus: none   Diplopia: none  Ophthalmoparesis: none  Upgaze: normal  Downgaze: normal  Conjugate gaze: present  Vestibulo-ocular reflex: present    CN V   Facial sensation intact.   Right corneal reflex: normal  Left corneal reflex: normal    CN VII   Right facial weakness: none  Left facial weakness: none    CN VIII   Hearing: intact    CN IX, X   Palate: symmetric  Right gag reflex: normal  Left gag reflex: normal    CN XI   Right sternocleidomastoid strength: normal  Left sternocleidomastoid strength: normal    CN XII   Tongue: not atrophic  Fasciculations: absent  Tongue deviation: none       Masked face and decreased blink     Motor Exam "   Muscle bulk: normal  Overall muscle tone: normal  Right arm tone: normal  Left arm tone: normal and cogwheel rigidity  Right leg tone: normal  Left leg tone: increased    Strength   Strength 5/5 throughout.     Sensory Exam   Light touch normal.   Vibration normal.   Proprioception normal.   Pinprick normal.     Gait, Coordination, and Reflexes     Gait  Gait: (decreased arm swing on left )    Coordination   Romberg: negative  Finger to nose coordination: normal  Heel to shin coordination: normal  Tandem walking coordination: normal    Tremor   Resting tremor: present (left arm)  Intention tremor: present  Action tremor: left arm and left leg    Reflexes   Reflexes 2+ except as noted.        FABRICIO attenuated in left arm and leg.       Physical Exam   Constitutional: He is oriented to person, place, and time.   Eyes: EOM are normal. Pupils are equal, round, and reactive to light.   Neurological: He is oriented to person, place, and time. He has normal strength. He has a normal Finger-Nose-Finger Test, a normal Heel to Shin Test, a normal Romberg Test and a normal Tandem Gait Test.   Psychiatric: His speech is normal.       Assessment/Plan:       Problems Addressed this Visit        Nervous and Auditory    Parkinson's disease - Primary     Improved on Mirapex, Sinemet and Artane            Other    Moderate episode of recurrent major depressive disorder     Psychological condition is improving with treatment.  Medication changes per orders.  Psychological condition  will be reassessed at the next regular appointment.         Relevant Medications    escitalopram (LEXAPRO) 10 MG tablet

## 2017-05-03 ENCOUNTER — TELEPHONE (OUTPATIENT)
Dept: NEUROLOGY | Facility: CLINIC | Age: 47
End: 2017-05-03

## 2017-05-03 DIAGNOSIS — G20 PARKINSON'S DISEASE (HCC): Primary | ICD-10-CM

## 2017-08-08 ENCOUNTER — TELEPHONE (OUTPATIENT)
Dept: NEUROLOGY | Facility: CLINIC | Age: 47
End: 2017-08-08

## 2017-08-08 NOTE — TELEPHONE ENCOUNTER
----- Message from Aliceantonia Adorno sent at 8/8/2017  2:53 PM EDT -----  Regarding: FORGETFULNESS  Contact: [phone]  Patient states he has started forgetting things. Patient states he started texting inappropriate messages to a facebook friend. Also, patient stopped taking his antidepressant a month ago.

## 2017-08-08 NOTE — TELEPHONE ENCOUNTER
Adv pt of previous notes . Pt adv that he understood and he will give us a call back if he has any other problems

## 2017-08-28 ENCOUNTER — OFFICE VISIT (OUTPATIENT)
Dept: NEUROLOGY | Facility: CLINIC | Age: 47
End: 2017-08-28

## 2017-08-28 VITALS
WEIGHT: 204 LBS | BODY MASS INDEX: 27.63 KG/M2 | SYSTOLIC BLOOD PRESSURE: 130 MMHG | HEIGHT: 72 IN | OXYGEN SATURATION: 97 % | HEART RATE: 77 BPM | DIASTOLIC BLOOD PRESSURE: 84 MMHG

## 2017-08-28 DIAGNOSIS — G20 PARKINSON'S DISEASE (HCC): Primary | ICD-10-CM

## 2017-08-28 DIAGNOSIS — F33.1 MODERATE EPISODE OF RECURRENT MAJOR DEPRESSIVE DISORDER (HCC): ICD-10-CM

## 2017-08-28 PROCEDURE — 99213 OFFICE O/P EST LOW 20 MIN: CPT | Performed by: PSYCHIATRY & NEUROLOGY

## 2017-08-28 RX ORDER — SERTRALINE HYDROCHLORIDE 100 MG/1
100 TABLET, FILM COATED ORAL DAILY
Qty: 30 TABLET | Refills: 2 | Status: SHIPPED | OUTPATIENT
Start: 2017-08-28 | End: 2017-12-03 | Stop reason: SDUPTHER

## 2017-08-28 NOTE — ASSESSMENT & PLAN NOTE
Psychological condition is improving with treatment.  Medication changes per orders.  Psychological condition  will be reassessed at the next regular appointment     D/C lexapro due to rash and start Zoloft 100 mg qday.

## 2017-08-28 NOTE — ASSESSMENT & PLAN NOTE
Mood changes and impulsivity on Mirapex    Cognition improved with decreased Mirapex 0.75 mg TID    Continue Sinemet 25/100 2 tabs TID; Artane 4 mg TID

## 2017-08-28 NOTE — PROGRESS NOTES
Subjective:     Patient ID: Jose Luis Mandujano is a 47 y.o. male.    History of Present Illness     47 y.o.  male with PD and MDD returns in follow up.  Last visit on 4/25/17 continued  Mirapex 1.5 mg TID, Sinemet and Lexapro, Artane.     PD    Telephoned on 8/8/17 reporting memory loss and texting inappropriate messages to friend.  He had recently stopped Lexapro.  Recommended cutting mirapex in half and restarting Lexapro.     Decreased Mirapex 0.75 mg QID and decreased impulsivity.  Increased motor restlessness.   Tremor slightly returned on left arm and foot.       MDD    Mild anxiety but significant improvement in Lexapro, does have trouble in public settings. Rash on hands with restarting Lexapro.     PMH:    Previously followed by Dr Baker and last seen by Dr Baker on 2/9/16 renewed propranolol.  Initial eval with left hand tremor for several months starting in late 2014.  Tremor has now spread to left leg and developing left sided limp.  Slowing movements of left arm and leg.      Reviewed evaluation by Dr Kit Kunz on 9/6/16   Action tremor in left hand with onset 4 years ago.  Noticed at first with smoking a cigarette.   Started on primidone on 9/6/16.  Stopped due to CNS side effects.      MRI C-spine, my review, 3/4/16 C6/7 disc to the left   The following portions of the patient's history were reviewed and updated as appropriate: allergies, current medications, past family history, past medical history, past social history, past surgical history and problem list.    Review of Systems   Constitutional: Negative for activity change and unexpected weight change.   HENT: Negative for facial swelling, hearing loss, tinnitus, trouble swallowing and voice change.    Eyes: Negative for photophobia, pain and visual disturbance.   Respiratory: Negative for apnea and choking.    Gastrointestinal: Negative for constipation.   Endocrine: Negative for cold intolerance.   Genitourinary: Negative for difficulty  "urinating, frequency and urgency.   Musculoskeletal: Positive for gait problem. Negative for back pain and neck stiffness.   Allergic/Immunologic: Negative for immunocompromised state.   Neurological: Positive for tremors. Negative for dizziness, seizures, syncope, facial asymmetry, speech difficulty and light-headedness.   Hematological: Negative for adenopathy.   Psychiatric/Behavioral: Positive for behavioral problems. Negative for confusion, decreased concentration, hallucinations and sleep disturbance. The patient is nervous/anxious.         Objective:  Vitals:    08/28/17 1508   BP: 130/84   Pulse: 77   SpO2: 97%   Weight: 204 lb (92.5 kg)   Height: 72\" (182.9 cm)       Neurologic Exam     Mental Status   Oriented to person, place, and time.   Registration: recalls 3 of 3 objects. Recall at 5 minutes: recalls 3 of 3 objects. Follows 3 step commands.   Attention: normal. Concentration: normal.   Speech: speech is normal   Level of consciousness: alert  Knowledge: good and consistent with education.   Able to name object. Able to read. Able to repeat. Able to write. Normal comprehension.     Cranial Nerves     CN II   Visual fields full to confrontation.   Visual acuity: normal  Right visual field deficit: none  Left visual field deficit: none     CN III, IV, VI   Pupils are equal, round, and reactive to light.  Extraocular motions are normal.   Right pupil: Shape: regular. Reactivity: brisk. Consensual response: intact.   Left pupil: Shape: regular. Reactivity: brisk. Consensual response: intact.   Nystagmus: none   Diplopia: none  Ophthalmoparesis: none  Upgaze: normal  Downgaze: normal  Conjugate gaze: present  Vestibulo-ocular reflex: present    CN V   Facial sensation intact.   Right corneal reflex: normal  Left corneal reflex: normal    CN VII   Right facial weakness: none  Left facial weakness: none    CN VIII   Hearing: intact    CN IX, X   Palate: symmetric  Right gag reflex: normal  Left gag reflex: " normal    CN XI   Right sternocleidomastoid strength: normal  Left sternocleidomastoid strength: normal    CN XII   Tongue: not atrophic  Fasciculations: absent  Tongue deviation: none       Masked face and decreased blink     Motor Exam   Muscle bulk: normal  Overall muscle tone: normal  Right arm tone: normal  Left arm tone: normal and cogwheel rigidity  Right leg tone: normal  Left leg tone: increased    Strength   Strength 5/5 throughout.     Sensory Exam   Light touch normal.   Vibration normal.   Proprioception normal.   Pinprick normal.     Gait, Coordination, and Reflexes     Gait  Gait: (decreased arm swing on left )    Coordination   Romberg: negative  Finger to nose coordination: normal  Heel to shin coordination: normal  Tandem walking coordination: normal    Tremor   Resting tremor: present (left arm)  Intention tremor: present  Action tremor: left arm and left leg    Reflexes   Reflexes 2+ except as noted.        FABRICIO attenuated in left arm and leg.       Physical Exam   Constitutional: He is oriented to person, place, and time.   Eyes: EOM are normal. Pupils are equal, round, and reactive to light.   Neurological: He is oriented to person, place, and time. He has normal strength. He has a normal Finger-Nose-Finger Test, a normal Heel to Shin Test, a normal Romberg Test and a normal Tandem Gait Test.   Psychiatric: His speech is normal.       Assessment/Plan:       Problems Addressed this Visit        Nervous and Auditory    Parkinson's disease - Primary     Mood changes and impulsivity on Mirapex    Cognition improved with decreased Mirapex 0.75 mg TID    Continue Sinemet 25/100 2 tabs TID; Artane 4 mg TID            Other    Moderate episode of recurrent major depressive disorder     Psychological condition is improving with treatment.  Medication changes per orders.  Psychological condition  will be reassessed at the next regular appointment     D/C lexapro due to rash and start Zoloft 100 mg qday.          Relevant Medications    sertraline (ZOLOFT) 100 MG tablet

## 2017-12-04 RX ORDER — SERTRALINE HYDROCHLORIDE 100 MG/1
TABLET, FILM COATED ORAL
Qty: 30 TABLET | Refills: 2 | Status: SHIPPED | OUTPATIENT
Start: 2017-12-04 | End: 2018-02-28 | Stop reason: SDUPTHER

## 2018-02-28 ENCOUNTER — OFFICE VISIT (OUTPATIENT)
Dept: NEUROLOGY | Facility: CLINIC | Age: 48
End: 2018-02-28

## 2018-02-28 VITALS
WEIGHT: 215 LBS | SYSTOLIC BLOOD PRESSURE: 130 MMHG | BODY MASS INDEX: 29.12 KG/M2 | HEIGHT: 72 IN | DIASTOLIC BLOOD PRESSURE: 80 MMHG | HEART RATE: 78 BPM | RESPIRATION RATE: 16 BRPM | OXYGEN SATURATION: 98 %

## 2018-02-28 DIAGNOSIS — F33.1 MODERATE EPISODE OF RECURRENT MAJOR DEPRESSIVE DISORDER (HCC): ICD-10-CM

## 2018-02-28 DIAGNOSIS — G20 PARKINSON'S DISEASE (HCC): Primary | ICD-10-CM

## 2018-02-28 PROCEDURE — 99213 OFFICE O/P EST LOW 20 MIN: CPT | Performed by: PSYCHIATRY & NEUROLOGY

## 2018-02-28 RX ORDER — SERTRALINE HYDROCHLORIDE 100 MG/1
100 TABLET, FILM COATED ORAL DAILY
Qty: 90 TABLET | Refills: 3 | Status: SHIPPED | OUTPATIENT
Start: 2018-02-28 | End: 2019-08-04 | Stop reason: SDUPTHER

## 2018-02-28 RX ORDER — TRIHEXYPHENIDYL HYDROCHLORIDE 2 MG/1
4 TABLET ORAL 3 TIMES DAILY
Qty: 540 TABLET | Refills: 3 | Status: SHIPPED | OUTPATIENT
Start: 2018-02-28 | End: 2019-04-27 | Stop reason: SDUPTHER

## 2018-02-28 RX ORDER — PRAMIPEXOLE DIHYDROCHLORIDE 1.5 MG/1
1.5 TABLET ORAL 3 TIMES DAILY
Qty: 270 TABLET | Refills: 3 | Status: SHIPPED | OUTPATIENT
Start: 2018-02-28 | End: 2019-12-03 | Stop reason: SDUPTHER

## 2018-02-28 NOTE — PROGRESS NOTES
Subjective:     Patient ID: Jose Luis Mandujano is a 47 y.o. male.  Chief Complaint   Patient presents with   • Parkinson's Disease       History of Present Illness     47 y.o.  male with PD and MDD returns in follow up.  Last visit on 8/28/17 continued Mirapex 0.75 mg TID, Sinemet and Lexapro, Artane.     PD    Sinemet 25/100 1 tab 3 am, 1 tab 4:30 am; 1 tab 11:15 am; 1 tab 4 pm, 1 tab 8 pm  Mirapex 1.5 mg 3 am; 0.75 mg 4:30 am  Artane  2 mg 3 am, 2 mg 4:30 am, 2 mg 11:15 am. 2 mg 4 pm, 2 mg 8 pm    Denies acting out dreams.      Minimal tremor and bradykinesia.  Mild dyskinesias.     MDD    Mood is good and denies anxiety being an issue.      PMH:    Previously followed by Dr Baker and last seen by Dr Baker on 2/9/16 renewed propranolol.  Initial eval with left hand tremor for several months starting in late 2014.  Tremor has now spread to left leg and developing left sided limp.  Slowing movements of left arm and leg.      Reviewed evaluation by Dr Kit Kunz on 9/6/16   Action tremor in left hand with onset 4 years ago.  Noticed at first with smoking a cigarette.   Started on primidone on 9/6/16.  Stopped due to CNS side effects.      MRI C-spine, my review, 3/4/16 C6/7 disc to the left   The following portions of the patient's history were reviewed and updated as appropriate: allergies, current medications, past family history, past medical history, past social history, past surgical history and problem list.    Review of Systems   Constitutional: Negative for activity change and unexpected weight change.   HENT: Negative for facial swelling, hearing loss, tinnitus, trouble swallowing and voice change.    Eyes: Negative for photophobia, pain and visual disturbance.   Respiratory: Negative for apnea and choking.    Gastrointestinal: Negative for constipation.   Endocrine: Negative for cold intolerance.   Genitourinary: Negative for difficulty urinating, frequency and urgency.   Musculoskeletal: Positive for  "gait problem. Negative for back pain and neck stiffness.   Allergic/Immunologic: Negative for immunocompromised state.   Neurological: Positive for tremors. Negative for dizziness, seizures, syncope, facial asymmetry, speech difficulty and light-headedness.   Hematological: Negative for adenopathy.   Psychiatric/Behavioral: Positive for behavioral problems. Negative for confusion, decreased concentration, hallucinations and sleep disturbance. The patient is nervous/anxious.         Objective:  Vitals:    02/28/18 1502   BP: 130/80   BP Location: Left arm   Patient Position: Sitting   Cuff Size: Adult   Pulse: 78   Resp: 16   SpO2: 98%   Weight: 97.5 kg (215 lb)   Height: 182.9 cm (72\")       Neurologic Exam     Mental Status   Oriented to person, place, and time.   Registration: recalls 3 of 3 objects. Recall at 5 minutes: recalls 3 of 3 objects. Follows 3 step commands.   Attention: normal. Concentration: normal.   Speech: speech is normal   Level of consciousness: alert  Knowledge: good and consistent with education.   Able to name object. Able to read. Able to repeat. Able to write. Normal comprehension.     Cranial Nerves     CN II   Visual fields full to confrontation.   Visual acuity: normal  Right visual field deficit: none  Left visual field deficit: none     CN III, IV, VI   Pupils are equal, round, and reactive to light.  Extraocular motions are normal.   Right pupil: Shape: regular. Reactivity: brisk. Consensual response: intact.   Left pupil: Shape: regular. Reactivity: brisk. Consensual response: intact.   Nystagmus: none   Diplopia: none  Ophthalmoparesis: none  Upgaze: normal  Downgaze: normal  Conjugate gaze: present  Vestibulo-ocular reflex: present    CN V   Facial sensation intact.   Right corneal reflex: normal  Left corneal reflex: normal    CN VII   Right facial weakness: none  Left facial weakness: none    CN VIII   Hearing: intact    CN IX, X   Palate: symmetric  Right gag reflex: " normal  Left gag reflex: normal    CN XI   Right sternocleidomastoid strength: normal  Left sternocleidomastoid strength: normal    CN XII   Tongue: not atrophic  Fasciculations: absent  Tongue deviation: none       Masked face and decreased blink     Motor Exam   Muscle bulk: normal  Overall muscle tone: normal  Right arm tone: normal  Left arm tone: normal and cogwheel rigidity  Right leg tone: normal  Left leg tone: increased    Strength   Strength 5/5 throughout.     Sensory Exam   Light touch normal.   Vibration normal.   Proprioception normal.   Pinprick normal.     Gait, Coordination, and Reflexes     Gait  Gait: (decreased arm swing on left )    Coordination   Romberg: negative  Finger to nose coordination: normal  Heel to shin coordination: normal  Tandem walking coordination: normal    Tremor   Resting tremor: present (left arm)  Intention tremor: present  Action tremor: left arm and left leg    Reflexes   Reflexes 2+ except as noted.        FABRICIO attenuated in left arm and leg.       Physical Exam   Constitutional: He is oriented to person, place, and time.   Eyes: EOM are normal. Pupils are equal, round, and reactive to light.   Neurological: He is oriented to person, place, and time. He has normal strength. He has a normal Finger-Nose-Finger Test, a normal Heel to Shin Test, a normal Romberg Test and a normal Tandem Gait Test.   Psychiatric: His speech is normal.       Assessment/Plan:       Problems Addressed this Visit        Nervous and Auditory    Parkinson's disease - Primary     Sx stable on Sinemet, Mirapex, Artane          Relevant Medications    carbidopa-levodopa (SINEMET)  MG per tablet    pramipexole (MIRAPEX) 1.5 MG tablet    trihexyphenidyl (ARTANE) 2 MG tablet       Other    Moderate episode of recurrent major depressive disorder     Psychological condition is improving with treatment.  Continue current treatment regimen.  Psychological condition  will be reassessed at the next  regular appointment.         Relevant Medications    sertraline (ZOLOFT) 100 MG tablet

## 2018-08-27 ENCOUNTER — OFFICE VISIT (OUTPATIENT)
Dept: NEUROLOGY | Facility: CLINIC | Age: 48
End: 2018-08-27

## 2018-08-27 VITALS
SYSTOLIC BLOOD PRESSURE: 132 MMHG | HEIGHT: 72 IN | BODY MASS INDEX: 29.12 KG/M2 | RESPIRATION RATE: 16 BRPM | HEART RATE: 72 BPM | DIASTOLIC BLOOD PRESSURE: 84 MMHG | OXYGEN SATURATION: 98 % | WEIGHT: 215 LBS

## 2018-08-27 DIAGNOSIS — G20 PARKINSON'S DISEASE (HCC): Primary | ICD-10-CM

## 2018-08-27 DIAGNOSIS — F33.1 MODERATE EPISODE OF RECURRENT MAJOR DEPRESSIVE DISORDER (HCC): ICD-10-CM

## 2018-08-27 PROCEDURE — 99213 OFFICE O/P EST LOW 20 MIN: CPT | Performed by: PSYCHIATRY & NEUROLOGY

## 2018-08-27 NOTE — PROGRESS NOTES
Subjective:     Patient ID: Jose Luis Mandujano is a 48 y.o. male.  Chief Complaint   Patient presents with   • Parkinson's Disease       History of Present Illness     48 y.o.  male with PD and MDD returns in follow up.  Last visit on 2/28/17 continued Mirapex 0.75 mg TID, Sinemet and Lexapro, Artane.     PD    Sinemet 25/100 2 tab 3 am, 1 tab 11:00 am; 1 tab 4 pm, 1 tab 8 pm  Mirapex 1.5 mg 3 am; 0.75 mg 3 pm  Artane  4 mg 3 am; 2 mg 11:00 am. 2 mg 4 pm, 2 mg 8 pm    Minimal tremor and bradykinesia.  Mild dyskinesias.  Losing train of thought and decreased att/conc.     MDD    Mood is stable.      PMH:    Previously followed by Dr Baker and last seen by Dr Baker on 2/9/16 renewed propranolol.  Initial eval with left hand tremor for several months starting in late 2014.  Tremor has now spread to left leg and developing left sided limp.  Slowing movements of left arm and leg.      Reviewed evaluation by Dr Kit Kunz on 9/6/16   Action tremor in left hand with onset 4 years ago.  Noticed at first with smoking a cigarette.   Started on primidone on 9/6/16.  Stopped due to CNS side effects.      MRI C-spine, my review, 3/4/16 C6/7 disc to the left   The following portions of the patient's history were reviewed and updated as appropriate: allergies, current medications, past family history, past medical history, past social history, past surgical history and problem list.    Review of Systems   Constitutional: Negative for activity change and unexpected weight change.   HENT: Negative for facial swelling, hearing loss, tinnitus, trouble swallowing and voice change.    Eyes: Negative for photophobia, pain and visual disturbance.   Respiratory: Negative for apnea and choking.    Gastrointestinal: Negative for constipation.   Endocrine: Negative for cold intolerance.   Genitourinary: Negative for difficulty urinating, frequency and urgency.   Musculoskeletal: Positive for gait problem. Negative for back pain and neck  "stiffness.   Allergic/Immunologic: Negative for immunocompromised state.   Neurological: Positive for tremors. Negative for dizziness, seizures, syncope, facial asymmetry, speech difficulty and light-headedness.   Hematological: Negative for adenopathy.   Psychiatric/Behavioral: Positive for behavioral problems. Negative for confusion, decreased concentration, hallucinations and sleep disturbance. The patient is nervous/anxious.         Objective:  Vitals:    08/27/18 1513   BP: 132/84   BP Location: Left arm   Patient Position: Sitting   Cuff Size: Adult   Pulse: 72   Resp: 16   SpO2: 98%   Weight: 97.5 kg (215 lb)   Height: 182.9 cm (72\")       Neurologic Exam     Mental Status   Oriented to person, place, and time.   Registration: recalls 3 of 3 objects. Recall at 5 minutes: recalls 3 of 3 objects. Follows 3 step commands.   Attention: normal. Concentration: normal.   Speech: speech is normal   Level of consciousness: alert  Knowledge: good and consistent with education.   Able to name object. Able to read. Able to repeat. Able to write. Normal comprehension.     Cranial Nerves     CN II   Visual fields full to confrontation.   Visual acuity: normal  Right visual field deficit: none  Left visual field deficit: none     CN III, IV, VI   Pupils are equal, round, and reactive to light.  Extraocular motions are normal.   Right pupil: Shape: regular. Reactivity: brisk. Consensual response: intact.   Left pupil: Shape: regular. Reactivity: brisk. Consensual response: intact.   Nystagmus: none   Diplopia: none  Ophthalmoparesis: none  Upgaze: normal  Downgaze: normal  Conjugate gaze: present  Vestibulo-ocular reflex: present    CN V   Facial sensation intact.   Right corneal reflex: normal  Left corneal reflex: normal    CN VII   Right facial weakness: none  Left facial weakness: none    CN VIII   Hearing: intact    CN IX, X   Palate: symmetric  Right gag reflex: normal  Left gag reflex: normal    CN XI   Right " sternocleidomastoid strength: normal  Left sternocleidomastoid strength: normal    CN XII   Tongue: not atrophic  Fasciculations: absent  Tongue deviation: none  Masked face and decreased blink     Motor Exam   Muscle bulk: normal  Overall muscle tone: normal  Right arm tone: normal  Left arm tone: normal and cogwheel rigidity  Right leg tone: normal  Left leg tone: increased    Strength   Strength 5/5 throughout.     Sensory Exam   Light touch normal.   Vibration normal.   Proprioception normal.   Pinprick normal.     Gait, Coordination, and Reflexes     Gait  Gait: (decreased arm swing on left )    Coordination   Romberg: negative  Finger to nose coordination: normal  Heel to shin coordination: normal  Tandem walking coordination: normal    Tremor   Resting tremor: present (left arm)  Intention tremor: present  Action tremor: left arm and left leg    Reflexes   Reflexes 2+ except as noted. FABRICIO attenuated in left arm and leg.       Physical Exam   Constitutional: He is oriented to person, place, and time.   Eyes: Pupils are equal, round, and reactive to light. EOM are normal.   Neurological: He is oriented to person, place, and time. He has normal strength. He has a normal Finger-Nose-Finger Test, a normal Heel to Shin Test, a normal Romberg Test and a normal Tandem Gait Test.   Psychiatric: His speech is normal.       Assessment/Plan:       Problems Addressed this Visit        Nervous and Auditory    Parkinson's disease (CMS/HCC) - Primary     Sx controlled on Artane, Sinemet and mirapex            Other    Moderate episode of recurrent major depressive disorder (CMS/HCC)     Psychological condition is improving with treatment.  Continue current treatment regimen.  Psychological condition  will be reassessed at the next regular appointment.

## 2019-02-18 ENCOUNTER — OFFICE VISIT (OUTPATIENT)
Dept: NEUROLOGY | Facility: CLINIC | Age: 49
End: 2019-02-18

## 2019-02-18 VITALS
HEIGHT: 72 IN | BODY MASS INDEX: 29.39 KG/M2 | SYSTOLIC BLOOD PRESSURE: 138 MMHG | HEART RATE: 74 BPM | DIASTOLIC BLOOD PRESSURE: 92 MMHG | OXYGEN SATURATION: 98 % | RESPIRATION RATE: 18 BRPM | WEIGHT: 217 LBS

## 2019-02-18 DIAGNOSIS — F33.1 MODERATE EPISODE OF RECURRENT MAJOR DEPRESSIVE DISORDER (HCC): ICD-10-CM

## 2019-02-18 DIAGNOSIS — G20 PARKINSON'S DISEASE (HCC): Primary | ICD-10-CM

## 2019-02-18 PROCEDURE — 99213 OFFICE O/P EST LOW 20 MIN: CPT | Performed by: PSYCHIATRY & NEUROLOGY

## 2019-02-18 NOTE — PROGRESS NOTES
Subjective:     Patient ID: Jose Luis Mandujano is a 48 y.o. male.    Chief Complaint   Patient presents with   • Parkinson's Disease       History of Present Illness     48 y.o. RH  male with PD and MDD returns in follow up.  Last visit on 8/27/18 continued Mirapex, Sinemet and Lexapro, Artane.     PD    Sinemet 25/100 2 tab 3 am, 1 tab 11:00 am; 1 tab 4 pm, 1 tab 8 pm  Mirapex 1.5 mg 3 am; 0.75 mg 3 pm  Artane  4 mg 3 am; 2 mg 11:00 am. 2 mg 4 pm, 2 mg 8 pm    Rare left foot tremors.  Noticed when driving in snow.      Denies falls.  Some clumsiness with hands.  Mild decrease in attention and concentration.    MDD    Mood is stable.      PMH:    Previously followed by Dr Baker and last seen by Dr Baker on 2/9/16 renewed propranolol.  Initial eval with left hand tremor for several months starting in late 2014.  Tremor has now spread to left leg and developing left sided limp.  Slowing movements of left arm and leg.      Reviewed evaluation by Dr Kit Kunz on 9/6/16   Action tremor in left hand with onset 4 years ago.  Noticed at first with smoking a cigarette.   Started on primidone on 9/6/16.  Stopped due to CNS side effects.      MRI C-spine, my review, 3/4/16 C6/7 disc to the left   The following portions of the patient's history were reviewed and updated as appropriate: allergies, current medications, past family history, past medical history, past social history, past surgical history and problem list.    Review of Systems   Constitutional: Negative for activity change and unexpected weight change.   HENT: Negative for facial swelling, hearing loss, tinnitus, trouble swallowing and voice change.    Eyes: Negative for photophobia, pain and visual disturbance.   Respiratory: Negative for apnea and choking.    Gastrointestinal: Negative for constipation.   Endocrine: Negative for cold intolerance.   Genitourinary: Negative for difficulty urinating, frequency and urgency.   Musculoskeletal: Positive for gait  "problem. Negative for back pain and neck stiffness.   Allergic/Immunologic: Negative for immunocompromised state.   Neurological: Positive for tremors. Negative for dizziness, seizures, syncope, facial asymmetry, speech difficulty and light-headedness.   Hematological: Negative for adenopathy.   Psychiatric/Behavioral: Positive for behavioral problems. Negative for confusion, decreased concentration, hallucinations and sleep disturbance. The patient is nervous/anxious.         Objective:  Vitals:    02/18/19 1515   BP: 138/92   BP Location: Right arm   Patient Position: Sitting   Cuff Size: Adult   Pulse: 74   Resp: 18   SpO2: 98%   Weight: 98.4 kg (217 lb)   Height: 182.9 cm (72\")       Neurologic Exam     Mental Status   Oriented to person, place, and time.   Registration: recalls 3 of 3 objects. Recall at 5 minutes: recalls 3 of 3 objects. Follows 3 step commands.   Attention: normal. Concentration: normal.   Speech: speech is normal   Level of consciousness: alert  Knowledge: good and consistent with education.   Able to name object. Able to read. Able to repeat. Able to write. Normal comprehension.     Cranial Nerves     CN II   Visual fields full to confrontation.   Visual acuity: normal  Right visual field deficit: none  Left visual field deficit: none     CN III, IV, VI   Pupils are equal, round, and reactive to light.  Extraocular motions are normal.   Right pupil: Shape: regular. Reactivity: brisk. Consensual response: intact.   Left pupil: Shape: regular. Reactivity: brisk. Consensual response: intact.   Nystagmus: none   Diplopia: none  Ophthalmoparesis: none  Upgaze: normal  Downgaze: normal  Conjugate gaze: present  Vestibulo-ocular reflex: present    CN V   Facial sensation intact.   Right corneal reflex: normal  Left corneal reflex: normal    CN VII   Right facial weakness: none  Left facial weakness: none    CN VIII   Hearing: intact    CN IX, X   Palate: symmetric  Right gag reflex: normal  Left " gag reflex: normal    CN XI   Right sternocleidomastoid strength: normal  Left sternocleidomastoid strength: normal    CN XII   Tongue: not atrophic  Fasciculations: absent  Tongue deviation: none  Masked face and decreased blink     Motor Exam   Muscle bulk: normal  Overall muscle tone: normal  Right arm tone: normal  Left arm tone: normal and cogwheel rigidity  Right leg tone: normal  Left leg tone: increased    Strength   Strength 5/5 throughout.     Sensory Exam   Light touch normal.   Vibration normal.   Proprioception normal.   Pinprick normal.     Gait, Coordination, and Reflexes     Gait  Gait: (decreased arm swing on left )    Coordination   Romberg: negative  Finger to nose coordination: normal  Heel to shin coordination: normal  Tandem walking coordination: normal    Tremor   Resting tremor: present (left arm)  Intention tremor: present  Action tremor: left arm and left leg    Reflexes   Reflexes 2+ except as noted. FABRICIO attenuated in left arm and leg.       Physical Exam   Constitutional: He is oriented to person, place, and time.   Eyes: EOM are normal. Pupils are equal, round, and reactive to light.   Neurological: He is oriented to person, place, and time. He has normal strength. He has a normal Finger-Nose-Finger Test, a normal Heel to Shin Test, a normal Romberg Test and a normal Tandem Gait Test.   Psychiatric: His speech is normal.       Assessment/Plan:       Problems Addressed this Visit        Nervous and Auditory    Parkinson's disease (CMS/HCC) - Primary     Sx controlled on Artane, Mirapex and Sinemet             Other    Moderate episode of recurrent major depressive disorder (CMS/HCC)     Psychological condition is unchanged.  Continue current treatment regimen.  Psychological condition  will be reassessed at the next regular appointment.

## 2019-04-29 RX ORDER — TRIHEXYPHENIDYL HYDROCHLORIDE 2 MG/1
TABLET ORAL
Qty: 180 TABLET | Refills: 3 | Status: SHIPPED | OUTPATIENT
Start: 2019-04-29 | End: 2019-08-05 | Stop reason: SDUPTHER

## 2019-08-05 ENCOUNTER — TELEPHONE (OUTPATIENT)
Dept: NEUROLOGY | Facility: CLINIC | Age: 49
End: 2019-08-05

## 2019-08-05 RX ORDER — TRIHEXYPHENIDYL HYDROCHLORIDE 2 MG/1
4 TABLET ORAL 3 TIMES DAILY
Qty: 180 TABLET | Refills: 3 | Status: SHIPPED | OUTPATIENT
Start: 2019-08-05 | End: 2019-12-03 | Stop reason: SDUPTHER

## 2019-08-05 RX ORDER — SERTRALINE HYDROCHLORIDE 100 MG/1
TABLET, FILM COATED ORAL
Qty: 90 TABLET | Refills: 0 | Status: SHIPPED | OUTPATIENT
Start: 2019-08-05 | End: 2019-12-03 | Stop reason: SDUPTHER

## 2019-08-05 NOTE — TELEPHONE ENCOUNTER
----- Message from Alice Adorno sent at 8/5/2019 10:14 AM EDT -----  Regarding: REFILL MEDICATION  Contact: [phone]  [email or web address]    Patient states he is making multiple trips to the pharmacy. Some how is medication has gotten off of the 90 days cycle. Patient request we call the pharmacy and return his scripts to 90 days    Please refill sertraline 100 mg   Carbidopa-levodopa  mg  Artane 2 mg

## 2019-12-03 ENCOUNTER — OFFICE VISIT (OUTPATIENT)
Dept: NEUROLOGY | Facility: CLINIC | Age: 49
End: 2019-12-03

## 2019-12-03 VITALS
SYSTOLIC BLOOD PRESSURE: 134 MMHG | HEIGHT: 72 IN | DIASTOLIC BLOOD PRESSURE: 86 MMHG | HEART RATE: 87 BPM | WEIGHT: 218 LBS | OXYGEN SATURATION: 98 % | BODY MASS INDEX: 29.53 KG/M2

## 2019-12-03 DIAGNOSIS — F33.1 MODERATE EPISODE OF RECURRENT MAJOR DEPRESSIVE DISORDER (HCC): ICD-10-CM

## 2019-12-03 DIAGNOSIS — G20 PARKINSON'S DISEASE (HCC): Primary | ICD-10-CM

## 2019-12-03 PROCEDURE — 99213 OFFICE O/P EST LOW 20 MIN: CPT | Performed by: PSYCHIATRY & NEUROLOGY

## 2019-12-03 RX ORDER — SERTRALINE HYDROCHLORIDE 100 MG/1
100 TABLET, FILM COATED ORAL DAILY
Qty: 90 TABLET | Refills: 3 | Status: SHIPPED | OUTPATIENT
Start: 2019-12-03 | End: 2020-06-04 | Stop reason: SDUPTHER

## 2019-12-03 RX ORDER — TRIHEXYPHENIDYL HYDROCHLORIDE 2 MG/1
4 TABLET ORAL 3 TIMES DAILY
Qty: 180 TABLET | Refills: 3 | Status: SHIPPED | OUTPATIENT
Start: 2019-12-03 | End: 2019-12-03 | Stop reason: SDUPTHER

## 2019-12-03 RX ORDER — TRIHEXYPHENIDYL HYDROCHLORIDE 2 MG/1
4 TABLET ORAL 3 TIMES DAILY
Qty: 540 TABLET | Refills: 3 | Status: SHIPPED | OUTPATIENT
Start: 2019-12-03 | End: 2020-06-04 | Stop reason: SDUPTHER

## 2019-12-03 RX ORDER — PRAMIPEXOLE DIHYDROCHLORIDE 1.5 MG/1
1.5 TABLET ORAL 3 TIMES DAILY
Qty: 270 TABLET | Refills: 3 | Status: SHIPPED | OUTPATIENT
Start: 2019-12-03 | End: 2020-06-04 | Stop reason: SDUPTHER

## 2019-12-03 RX ORDER — PRAMIPEXOLE DIHYDROCHLORIDE 1.5 MG/1
1.5 TABLET ORAL 3 TIMES DAILY
Qty: 270 TABLET | Refills: 3 | Status: SHIPPED | OUTPATIENT
Start: 2019-12-03 | End: 2019-12-03 | Stop reason: SDUPTHER

## 2019-12-03 NOTE — PROGRESS NOTES
Subjective:     Patient ID: Jose Luis Mandujano is a 49 y.o. male.    Chief Complaint   Patient presents with   • Parkinson's Disease       History of Present Illness     49 y.o. RH  male with PD and MDD returns in follow up.  Last visit on 2/18/19 continued Mirapex, Sinemet and Zoloft, Artane.     PD    Sinemet 25/100 2 tab 4 am, 2 tab 1:30 pm;  1 tab 8 pm  Mirapex 1.5 mg 3 am; 0.75 mg 1:30 pm  Artane  4 mg 3 am; 4 mg 1:30 pm. 2 - 4 mg 8 pm    Denies tremors.  In am left foot will want to cramp.        Denies falls.  Some clumsiness with hands.  Mild decrease in attention and concentration.    MDD    Mood is stable on Zoloft.     PMH:    Previously followed by Dr Baker and last seen by Dr Baker on 2/9/16 renewed propranolol.  Initial eval with left hand tremor for several months starting in late 2014.  Tremor has now spread to left leg and developing left sided limp.  Slowing movements of left arm and leg.      Reviewed evaluation by Dr Kit Kunz on 9/6/16   Action tremor in left hand with onset 4 years ago.  Noticed at first with smoking a cigarette.   Started on primidone on 9/6/16.  Stopped due to CNS side effects.      MRI C-spine, my review, 3/4/16 C6/7 disc to the left   The following portions of the patient's history were reviewed and updated as appropriate: allergies, current medications, past family history, past medical history, past social history, past surgical history and problem list.    Review of Systems   Constitutional: Negative for activity change and unexpected weight change.   HENT: Negative for facial swelling, hearing loss, tinnitus, trouble swallowing and voice change.    Eyes: Negative for photophobia, pain and visual disturbance.   Respiratory: Negative for apnea and choking.    Gastrointestinal: Negative for constipation.   Endocrine: Negative for cold intolerance.   Genitourinary: Negative for difficulty urinating, frequency and urgency.   Musculoskeletal: Positive for gait problem.  "Negative for back pain and neck stiffness.   Allergic/Immunologic: Negative for immunocompromised state.   Neurological: Positive for tremors. Negative for dizziness, seizures, syncope, facial asymmetry, speech difficulty and light-headedness.   Hematological: Negative for adenopathy.   Psychiatric/Behavioral: Positive for behavioral problems. Negative for confusion, decreased concentration, hallucinations and sleep disturbance. The patient is nervous/anxious.         Objective:  Vitals:    12/03/19 1502   BP: 134/86   Pulse: 87   SpO2: 98%   Weight: 98.9 kg (218 lb)   Height: 182.9 cm (72\")       Neurologic Exam     Mental Status   Oriented to person, place, and time.   Registration: recalls 3 of 3 objects. Recall at 5 minutes: recalls 3 of 3 objects. Follows 3 step commands.   Attention: normal. Concentration: normal.   Speech: speech is normal   Level of consciousness: alert  Knowledge: good and consistent with education.   Able to name object. Able to read. Able to repeat. Able to write. Normal comprehension.     Cranial Nerves     CN II   Visual fields full to confrontation.   Visual acuity: normal  Right visual field deficit: none  Left visual field deficit: none     CN III, IV, VI   Pupils are equal, round, and reactive to light.  Extraocular motions are normal.   Right pupil: Shape: regular. Reactivity: brisk. Consensual response: intact.   Left pupil: Shape: regular. Reactivity: brisk. Consensual response: intact.   Nystagmus: none   Diplopia: none  Ophthalmoparesis: none  Upgaze: normal  Downgaze: normal  Conjugate gaze: present  Vestibulo-ocular reflex: present    CN V   Facial sensation intact.   Right corneal reflex: normal  Left corneal reflex: normal    CN VII   Right facial weakness: none  Left facial weakness: none    CN VIII   Hearing: intact    CN IX, X   Palate: symmetric  Right gag reflex: normal  Left gag reflex: normal    CN XI   Right sternocleidomastoid strength: normal  Left " sternocleidomastoid strength: normal    CN XII   Tongue: not atrophic  Fasciculations: absent  Tongue deviation: none  Masked face and decreased blink     Motor Exam   Muscle bulk: normal  Overall muscle tone: normal  Right arm tone: normal  Left arm tone: normal and cogwheel rigidity  Right leg tone: normal  Left leg tone: increased    Strength   Strength 5/5 throughout.     Sensory Exam   Light touch normal.   Vibration normal.   Proprioception normal.   Pinprick normal.     Gait, Coordination, and Reflexes     Gait  Gait: (decreased arm swing on left )    Coordination   Romberg: negative  Finger to nose coordination: normal  Heel to shin coordination: normal  Tandem walking coordination: normal    Tremor   Resting tremor: present (left arm)  Intention tremor: present  Action tremor: left arm and left leg    Reflexes   Reflexes 2+ except as noted. FABRICIO attenuated in left arm and leg.       Physical Exam   Constitutional: He is oriented to person, place, and time.   Eyes: EOM are normal. Pupils are equal, round, and reactive to light.   Neurological: He is oriented to person, place, and time. He has normal strength. He has a normal Finger-Nose-Finger Test, a normal Heel to Shin Test, a normal Romberg Test and a normal Tandem Gait Test.   Psychiatric: His speech is normal.       Assessment/Plan:       Problems Addressed this Visit        Nervous and Auditory    Parkinson's disease (CMS/HCC) - Primary     Sx controlled on Mirapex, Sinemet, Artane             Other    Moderate episode of recurrent major depressive disorder (CMS/HCC)     Psychological condition is improving with treatment.  Continue current treatment regimen.  Psychological condition  will be reassessed at the next regular appointment.

## 2020-06-04 ENCOUNTER — OFFICE VISIT (OUTPATIENT)
Dept: NEUROLOGY | Facility: CLINIC | Age: 50
End: 2020-06-04

## 2020-06-04 VITALS
OXYGEN SATURATION: 98 % | HEIGHT: 72 IN | DIASTOLIC BLOOD PRESSURE: 88 MMHG | BODY MASS INDEX: 27.9 KG/M2 | SYSTOLIC BLOOD PRESSURE: 159 MMHG | HEART RATE: 77 BPM | WEIGHT: 206 LBS | TEMPERATURE: 97.2 F

## 2020-06-04 DIAGNOSIS — F33.1 MODERATE EPISODE OF RECURRENT MAJOR DEPRESSIVE DISORDER (HCC): ICD-10-CM

## 2020-06-04 DIAGNOSIS — G20 PARKINSON'S DISEASE (HCC): Primary | ICD-10-CM

## 2020-06-04 PROCEDURE — 99213 OFFICE O/P EST LOW 20 MIN: CPT | Performed by: PSYCHIATRY & NEUROLOGY

## 2020-06-04 RX ORDER — SERTRALINE HYDROCHLORIDE 100 MG/1
100 TABLET, FILM COATED ORAL DAILY
Qty: 90 TABLET | Refills: 3 | Status: SHIPPED | OUTPATIENT
Start: 2020-06-04 | End: 2021-06-15 | Stop reason: SDUPTHER

## 2020-06-04 RX ORDER — TRIHEXYPHENIDYL HYDROCHLORIDE 2 MG/1
4 TABLET ORAL 3 TIMES DAILY
Qty: 540 TABLET | Refills: 3 | Status: SHIPPED | OUTPATIENT
Start: 2020-06-04 | End: 2021-06-15 | Stop reason: SDUPTHER

## 2020-06-04 RX ORDER — PRAMIPEXOLE DIHYDROCHLORIDE 1.5 MG/1
1.5 TABLET ORAL 3 TIMES DAILY
Qty: 270 TABLET | Refills: 3 | Status: SHIPPED | OUTPATIENT
Start: 2020-06-04 | End: 2021-06-15

## 2020-06-04 NOTE — PROGRESS NOTES
Subjective:     Patient ID: Jose Luis Mandujano is a 49 y.o. male.    Chief Complaint   Patient presents with   • Parkinson's Disease     6 month follow up        History of Present Illness     49 y.o. RH  male with PD and MDD returns in follow up.  Last visit on 12/3/19 continued Mirapex, Sinemet and Zoloft, Artane.     PD    Sinemet 25/100 2 tab 4 am, 2 tab 1:30 pm;  1 tab 8 pm  Mirapex 1.5 mg 3 am; 0.75 mg 1:30 pm  Artane  4 mg 3 am; 4 mg 1:30 pm. 2 - 4 mg 8 pm    If holding a drink or driving will have drift of left hand.    Working full time without limitations.     Denies tremors.         MDD    Mood is stable on Zoloft.     PMH:    Previously followed by Dr Baker and last seen by Dr Baker on 2/9/16 renewed propranolol.  Initial eval with left hand tremor for several months starting in late 2014.  Tremor has now spread to left leg and developing left sided limp.  Slowing movements of left arm and leg.      Reviewed evaluation by Dr Kit Kunz on 9/6/16   Action tremor in left hand with onset 4 years ago.  Noticed at first with smoking a cigarette.   Started on primidone on 9/6/16.  Stopped due to CNS side effects.      MRI C-spine, my review, 3/4/16 C6/7 disc to the left   The following portions of the patient's history were reviewed and updated as appropriate: allergies, current medications, past family history, past medical history, past social history, past surgical history and problem list.    Review of Systems   Constitutional: Negative for activity change and unexpected weight change.   HENT: Negative for facial swelling, hearing loss, tinnitus, trouble swallowing and voice change.    Eyes: Negative for photophobia, pain and visual disturbance.   Respiratory: Negative for apnea and choking.    Gastrointestinal: Negative for constipation.   Endocrine: Negative for cold intolerance.   Genitourinary: Negative for difficulty urinating, frequency and urgency.   Musculoskeletal: Positive for gait problem.  "Negative for back pain and neck stiffness.   Allergic/Immunologic: Negative for immunocompromised state.   Neurological: Positive for tremors. Negative for dizziness, seizures, syncope, facial asymmetry, speech difficulty and light-headedness.   Hematological: Negative for adenopathy.   Psychiatric/Behavioral: Positive for behavioral problems. Negative for confusion, decreased concentration, hallucinations and sleep disturbance. The patient is nervous/anxious.         Objective:  Vitals:    06/04/20 1511   BP: 159/88   Pulse: 77   Temp: 97.2 °F (36.2 °C)   SpO2: 98%   Weight: 93.4 kg (206 lb)   Height: 182.9 cm (72\")       Neurologic Exam     Mental Status   Oriented to person, place, and time.   Registration: recalls 3 of 3 objects. Recall at 5 minutes: recalls 3 of 3 objects. Follows 3 step commands.   Attention: normal. Concentration: normal.   Speech: speech is normal   Level of consciousness: alert  Knowledge: good and consistent with education.   Able to name object. Able to read. Able to repeat. Able to write. Normal comprehension.     Cranial Nerves     CN II   Visual fields full to confrontation.   Visual acuity: normal  Right visual field deficit: none  Left visual field deficit: none     CN III, IV, VI   Pupils are equal, round, and reactive to light.  Extraocular motions are normal.   Right pupil: Shape: regular. Reactivity: brisk. Consensual response: intact.   Left pupil: Shape: regular. Reactivity: brisk. Consensual response: intact.   Nystagmus: none   Diplopia: none  Ophthalmoparesis: none  Upgaze: normal  Downgaze: normal  Conjugate gaze: present  Vestibulo-ocular reflex: present    CN V   Facial sensation intact.   Right corneal reflex: normal  Left corneal reflex: normal    CN VII   Right facial weakness: none  Left facial weakness: none    CN VIII   Hearing: intact    CN IX, X   Palate: symmetric  Right gag reflex: normal  Left gag reflex: normal    CN XI   Right sternocleidomastoid strength: " normal  Left sternocleidomastoid strength: normal    CN XII   Tongue: not atrophic  Fasciculations: absent  Tongue deviation: none  Masked face and decreased blink     Motor Exam   Muscle bulk: normal  Overall muscle tone: normal  Right arm tone: normal  Left arm tone: normal and cogwheel rigidity  Right leg tone: normal  Left leg tone: increased    Strength   Strength 5/5 throughout.     Sensory Exam   Light touch normal.   Vibration normal.   Proprioception normal.   Pinprick normal.     Gait, Coordination, and Reflexes     Gait  Gait: (decreased arm swing on left )    Coordination   Romberg: negative  Finger to nose coordination: normal  Heel to shin coordination: normal  Tandem walking coordination: normal    Tremor   Resting tremor: present (left arm)  Intention tremor: present  Action tremor: left arm and left leg    Reflexes   Reflexes 2+ except as noted. FABRICIO attenuated in left arm and leg.       Physical Exam   Constitutional: He is oriented to person, place, and time.   Eyes: Pupils are equal, round, and reactive to light. EOM are normal.   Neurological: He is oriented to person, place, and time. He has normal strength. He has a normal Finger-Nose-Finger Test, a normal Heel to Shin Test, a normal Romberg Test and a normal Tandem Gait Test.   Psychiatric: His speech is normal.       Assessment/Plan:       Problems Addressed this Visit        Nervous and Auditory    Parkinson's disease (CMS/HCC) - Primary     Sx stable on Artane, Sinemet, Mirapex.          Relevant Medications    trihexyphenidyl (ARTANE) 2 MG tablet    pramipexole (MIRAPEX) 1.5 MG tablet    carbidopa-levodopa (SINEMET)  MG per tablet       Other    Moderate episode of recurrent major depressive disorder (CMS/HCC)     Psychological condition is improving with treatment.  Continue current treatment regimen.  Psychological condition  will be reassessed at the next regular appointment.         Relevant Medications    sertraline (ZOLOFT) 100  MG tablet

## 2020-12-08 ENCOUNTER — OFFICE VISIT (OUTPATIENT)
Dept: NEUROLOGY | Facility: CLINIC | Age: 50
End: 2020-12-08

## 2020-12-08 VITALS
HEART RATE: 82 BPM | TEMPERATURE: 97.1 F | BODY MASS INDEX: 28.89 KG/M2 | OXYGEN SATURATION: 99 % | WEIGHT: 213 LBS | SYSTOLIC BLOOD PRESSURE: 130 MMHG | DIASTOLIC BLOOD PRESSURE: 76 MMHG

## 2020-12-08 DIAGNOSIS — F33.1 MODERATE EPISODE OF RECURRENT MAJOR DEPRESSIVE DISORDER (HCC): ICD-10-CM

## 2020-12-08 DIAGNOSIS — G20 PARKINSON'S DISEASE (HCC): Primary | ICD-10-CM

## 2020-12-08 PROCEDURE — 99213 OFFICE O/P EST LOW 20 MIN: CPT | Performed by: PSYCHIATRY & NEUROLOGY

## 2020-12-08 NOTE — PROGRESS NOTES
Subjective:     Patient ID: Jose Luis Mandujano is a 50 y.o. male.    Chief Complaint   Patient presents with   • Parkinson's Disease       History of Present Illness     50 y.o. RH  male with PD and MDD returns in follow up.  Last visit on 6/4/20 continued Mirapex, Sinemet, Zoloft, Artane.     PD    Sinemet 25/100 2 tab 4 am, 2 tab 1:30 pm;  1 tab 8 pm  Mirapex 1.5 mg 3 am; 0.75 mg 1:30 pm  Artane  4 mg 3 am; 4 mg 1:30 pm. 2 - 4 mg 8 pm    No new or worsening sx.      Working full time without limitations.     Denies tremors.       MDD    Mood is stable on Zoloft.     PMH:    Previously followed by Dr Baker and last seen by Dr Baker on 2/9/16 renewed propranolol.  Initial eval with left hand tremor for several months starting in late 2014.  Tremor has now spread to left leg and developing left sided limp.  Slowing movements of left arm and leg.      Reviewed evaluation by Dr Kit Kunz on 9/6/16   Action tremor in left hand with onset 4 years ago.  Noticed at first with smoking a cigarette.   Started on primidone on 9/6/16.  Stopped due to CNS side effects.      MRI C-spine, my review, 3/4/16 C6/7 disc to the left     The following portions of the patient's history were reviewed and updated as appropriate: allergies, current medications, past family history, past medical history, past social history, past surgical history and problem list.    Review of Systems   Constitutional: Negative for activity change and unexpected weight change.   HENT: Negative for facial swelling, hearing loss, tinnitus, trouble swallowing and voice change.    Eyes: Negative for photophobia, pain and visual disturbance.   Respiratory: Negative for apnea and choking.    Gastrointestinal: Negative for constipation.   Endocrine: Negative for cold intolerance.   Genitourinary: Negative for difficulty urinating, frequency and urgency.   Musculoskeletal: Positive for gait problem. Negative for back pain and neck stiffness.    Allergic/Immunologic: Negative for immunocompromised state.   Neurological: Positive for tremors. Negative for dizziness, seizures, syncope, facial asymmetry, speech difficulty and light-headedness.   Hematological: Negative for adenopathy.   Psychiatric/Behavioral: Positive for behavioral problems. Negative for confusion, decreased concentration, hallucinations and sleep disturbance. The patient is nervous/anxious.         Objective:  Vitals:    12/08/20 1522   BP: 130/76   Pulse: 82   Temp: 97.1 °F (36.2 °C)   SpO2: 99%   Weight: 96.6 kg (213 lb)       Neurologic Exam     Mental Status   Oriented to person, place, and time.   Registration: recalls 3 of 3 objects. Recall at 5 minutes: recalls 3 of 3 objects. Follows 3 step commands.   Attention: normal. Concentration: normal.   Speech: speech is normal   Level of consciousness: alert  Knowledge: good and consistent with education.   Able to name object. Able to read. Able to repeat. Able to write. Normal comprehension.     Cranial Nerves     CN II   Visual fields full to confrontation.   Visual acuity: normal  Right visual field deficit: none  Left visual field deficit: none     CN III, IV, VI   Pupils are equal, round, and reactive to light.  Extraocular motions are normal.   Right pupil: Shape: regular. Reactivity: brisk. Consensual response: intact.   Left pupil: Shape: regular. Reactivity: brisk. Consensual response: intact.   Nystagmus: none   Diplopia: none  Ophthalmoparesis: none  Upgaze: normal  Downgaze: normal  Conjugate gaze: present  Vestibulo-ocular reflex: present    CN V   Facial sensation intact.   Right corneal reflex: normal  Left corneal reflex: normal    CN VII   Right facial weakness: none  Left facial weakness: none    CN VIII   Hearing: intact    CN IX, X   Palate: symmetric  Right gag reflex: normal  Left gag reflex: normal    CN XI   Right sternocleidomastoid strength: normal  Left sternocleidomastoid strength: normal    CN XII   Tongue:  not atrophic  Fasciculations: absent  Tongue deviation: none  Masked face and decreased blink     Motor Exam   Muscle bulk: normal  Overall muscle tone: normal  Right arm tone: normal  Left arm tone: normal and cogwheel rigidity  Right leg tone: normal  Left leg tone: increased    Strength   Strength 5/5 throughout.     Sensory Exam   Light touch normal.   Vibration normal.   Proprioception normal.   Pinprick normal.     Gait, Coordination, and Reflexes     Gait  Gait: (decreased arm swing on left )    Coordination   Romberg: negative  Finger to nose coordination: normal  Heel to shin coordination: normal  Tandem walking coordination: normal    Tremor   Resting tremor: present (left arm)  Intention tremor: present  Action tremor: left arm and left leg    Reflexes   Reflexes 2+ except as noted. FABRICIO attenuated in left arm and leg.       Physical Exam   Constitutional: He is oriented to person, place, and time.   Eyes: Pupils are equal, round, and reactive to light. EOM are normal.   Neurological: He is oriented to person, place, and time. He has normal strength. He has a normal Finger-Nose-Finger Test, a normal Heel to Shin Test, a normal Romberg Test and a normal Tandem Gait Test.   Psychiatric: His speech is normal.       Assessment/Plan:       Problems Addressed this Visit        Nervous and Auditory    Parkinson's disease (CMS/HCC) - Primary     Sx stable on Sinemet, Artane, Mirapex             Other    Moderate episode of recurrent major depressive disorder (CMS/HCC)     Psychological condition is improving with treatment.  Continue current treatment regimen.  Psychological condition  will be reassessed at the next regular appointment.           Diagnoses       Codes Comments    Parkinson's disease (CMS/HCC)    -  Primary ICD-10-CM: G20  ICD-9-CM: 332.0     Moderate episode of recurrent major depressive disorder (CMS/HCC)     ICD-10-CM: F33.1  ICD-9-CM: 296.32

## 2021-06-15 ENCOUNTER — OFFICE VISIT (OUTPATIENT)
Dept: NEUROLOGY | Facility: CLINIC | Age: 51
End: 2021-06-15

## 2021-06-15 VITALS
SYSTOLIC BLOOD PRESSURE: 130 MMHG | HEIGHT: 72 IN | HEART RATE: 77 BPM | OXYGEN SATURATION: 96 % | RESPIRATION RATE: 18 BRPM | BODY MASS INDEX: 27.66 KG/M2 | DIASTOLIC BLOOD PRESSURE: 90 MMHG | WEIGHT: 204.2 LBS

## 2021-06-15 DIAGNOSIS — F33.1 MODERATE EPISODE OF RECURRENT MAJOR DEPRESSIVE DISORDER (HCC): Chronic | ICD-10-CM

## 2021-06-15 DIAGNOSIS — G20 PARKINSON'S DISEASE (HCC): Primary | Chronic | ICD-10-CM

## 2021-06-15 PROCEDURE — 99214 OFFICE O/P EST MOD 30 MIN: CPT | Performed by: PSYCHIATRY & NEUROLOGY

## 2021-06-15 RX ORDER — TRIHEXYPHENIDYL HYDROCHLORIDE 2 MG/1
4 TABLET ORAL 3 TIMES DAILY
Qty: 540 TABLET | Refills: 3 | Status: SHIPPED | OUTPATIENT
Start: 2021-06-15 | End: 2021-11-18

## 2021-06-15 RX ORDER — PRAMIPEXOLE DIHYDROCHLORIDE 1.5 MG/1
1.5 TABLET ORAL 3 TIMES DAILY
COMMUNITY
End: 2021-06-15 | Stop reason: SDUPTHER

## 2021-06-15 RX ORDER — SERTRALINE HYDROCHLORIDE 100 MG/1
100 TABLET, FILM COATED ORAL DAILY
Qty: 90 TABLET | Refills: 3 | Status: SHIPPED | OUTPATIENT
Start: 2021-06-15 | End: 2022-08-08 | Stop reason: SDUPTHER

## 2021-06-15 RX ORDER — PRAMIPEXOLE DIHYDROCHLORIDE 1.5 MG/1
1.5 TABLET ORAL 2 TIMES DAILY
Qty: 180 TABLET | Refills: 3 | Status: SHIPPED | OUTPATIENT
Start: 2021-06-15 | End: 2021-09-01 | Stop reason: DRUGHIGH

## 2021-06-15 NOTE — PROGRESS NOTES
"Chief Complaint  Follow-up (6 month)    Subjective          Jose Luis Mandujano presents to [facility] NEUROLOGY       History of Present Illness    51 y.o. male returns in follow up.  Last visit on 12/8/2020 continued Mirapex, Sinemet, Zoloft, Artane.     PD     Sinemet 25/100 2 tab 4 am, 2 tab 1:30 pm;  1 tab 8 pm  Mirapex 1.5 mg 3 am; 1.5 mg 1:30 pm  Artane  4 mg 3 am; 4 mg 1:30 pm. 2 - 4 mg 8 pm     Missing dosages left foot will cramp.       Working full time without limitations.      Denies tremors.       Few episodes of depersonalization.     MDD     Mood is stable on Zoloft.      PMH:     Previously followed by Dr Baker and last seen by Dr Baker on 2/9/16 renewed propranolol.  Initial eval with left hand tremor for several months starting in late 2014.  Tremor has now spread to left leg and developing left sided limp.  Slowing movements of left arm and leg.       Reviewed evaluation by Dr Kit Kunz on 9/6/16   Action tremor in left hand with onset 4 years ago.  Noticed at first with smoking a cigarette.   Started on primidone on 9/6/16.  Stopped due to CNS side effects.       MRI C-spine, my review, 3/4/16 C6/7 disc to the left   Objective   Vital Signs:   /90   Pulse 77   Resp 18   Ht 182.9 cm (72.01\")   Wt 92.6 kg (204 lb 3.2 oz)   SpO2 96%   BMI 27.69 kg/m²     Physical Exam  Eyes:      Extraocular Movements: EOM normal.      Pupils: Pupils are equal, round, and reactive to light.   Neurological:      Mental Status: He is oriented to person, place, and time.      Coordination: Finger-Nose-Finger Test, Heel to Shin Test and Romberg Test normal.      Gait: Tandem walk normal.      Deep Tendon Reflexes: Strength normal.   Psychiatric:         Speech: Speech normal.          Neurologic Exam     Mental Status   Oriented to person, place, and time.   Registration: recalls 3 of 3 objects. Recall at 5 minutes: recalls 3 of 3 objects. Follows 3 step commands.   Attention: normal. " Concentration: normal.   Speech: speech is normal   Level of consciousness: alert  Knowledge: good and consistent with education.   Able to name object. Able to read. Able to repeat. Able to write. Normal comprehension.     Cranial Nerves     CN II   Visual fields full to confrontation.   Visual acuity: normal  Right visual field deficit: none  Left visual field deficit: none     CN III, IV, VI   Pupils are equal, round, and reactive to light.  Extraocular motions are normal.   Right pupil: Shape: regular. Reactivity: brisk. Consensual response: intact.   Left pupil: Shape: regular. Reactivity: brisk. Consensual response: intact.   Nystagmus: none   Diplopia: none  Ophthalmoparesis: none  Upgaze: normal  Downgaze: normal  Conjugate gaze: present  Vestibulo-ocular reflex: present    CN V   Facial sensation intact.   Right corneal reflex: normal  Left corneal reflex: normal    CN VII   Right facial weakness: none  Left facial weakness: none    CN VIII   Hearing: intact    CN IX, X   Palate: symmetric  Right gag reflex: normal  Left gag reflex: normal    CN XI   Right sternocleidomastoid strength: normal  Left sternocleidomastoid strength: normal    CN XII   Tongue: not atrophic  Fasciculations: absent  Tongue deviation: none  Masked face and decreased blink     Motor Exam   Muscle bulk: normal  Overall muscle tone: normal  Right arm tone: normal  Left arm tone: normal and cogwheel rigidity  Right leg tone: normal  Left leg tone: increased    Strength   Strength 5/5 throughout.     Sensory Exam   Light touch normal.   Vibration normal.   Proprioception normal.   Pinprick normal.     Gait, Coordination, and Reflexes     Gait  Gait: (decreased arm swing on left )    Coordination   Romberg: negative  Finger to nose coordination: normal  Heel to shin coordination: normal  Tandem walking coordination: normal    Tremor   Resting tremor: present (left arm)  Intention tremor: present  Action tremor: left arm and left  leg    Reflexes   Reflexes 2+ except as noted. FABRICIO attenuated in left arm and leg.      Result Review :                 Assessment and Plan    Diagnoses and all orders for this visit:    1. Parkinson's disease (CMS/[facility]) (Primary)  Assessment & Plan:  Sx stable     Continue Mirapex, Sinemet, Artane       2. Moderate episode of recurrent major depressive disorder (CMS/[facility])  Assessment & Plan:  Patient's depression is recurrent and is mild without psychosis. Their depression is currently in partial remission and the condition is unchanged. This will be reassessed at the next regular appointment. F/U as described:patient will continue current medication therapy.        Follow Up   No follow-ups on file.  Patient was given instructions and counseling regarding his condition or for health maintenance advice. Please see specific information pulled into the AVS if appropriate.

## 2021-06-15 NOTE — ASSESSMENT & PLAN NOTE
Patient's depression is recurrent and is mild without psychosis. Their depression is currently in partial remission and the condition is unchanged. This will be reassessed at the next regular appointment. F/U as described:patient will continue current medication therapy.

## 2021-08-04 NOTE — ASSESSMENT & PLAN NOTE
Minimal response to Mirapex and Sinemet    Increase Mirapex 1 mg TID, and continue sinemet     Detail Level: Detailed Render Post-Care Instructions In Note?: no Post-Care Instructions: I reviewed with the patient in detail post-care instructions. Patient is to wear sunprotection, and avoid picking at any of the treated lesions. Pt may apply Vaseline to crusted or scabbing areas. Duration Of Freeze Thaw-Cycle (Seconds): 0 Show Aperture Variable?: Yes Consent: The patient's consent was obtained including but not limited to risks of crusting, scabbing, blistering, scarring, darker or lighter pigmentary change, recurrence, incomplete removal and infection.

## 2021-08-31 ENCOUNTER — TELEPHONE (OUTPATIENT)
Dept: NEUROLOGY | Facility: CLINIC | Age: 51
End: 2021-08-31

## 2021-08-31 NOTE — TELEPHONE ENCOUNTER
Left detailed vm for Williams requesting he return call to clinic so we can get him put on for the soonest available follow up and get him in to see one of the APRN's to adjust his meds so this is not happening. Office # given. Thanks!

## 2021-08-31 NOTE — TELEPHONE ENCOUNTER
Provider: TAY DAIGLE    Caller: KURT    Relationship to Patient: SELF    Reason for Call: KURT CALLED AND STATED THAT HE WORKS FOR [employer].  LAST Thursday HE WAS DRIVING A BIG TUGER AND HE FELL ASLEEP DRIVING IT .  HE TOLD HIS WORK THAT HE FELL ASLEEP AND THEY SENT HIM HOME.  KURT  IS AFRAID HE IS GOING TO HURT OR KILL SOMEONE.  HE ALSO STATES THAT HE HAS FALLEN ASLEEP QUITE OFTEN DRIVING HOME FROM WORK.  AND DRIVING HIS FORKLIFT WHILE AT WORK.  HE IS NOT SURE IF HIS MEDICATION NEEDS TO BE ADJUSTED OR WHAT.  [employer] IS MAKING HIM STAY OFF UNTIL THIS IS RESOLVED.    PLEASE CALL TO DISCUSS AND ADVISE.

## 2021-09-01 ENCOUNTER — OFFICE VISIT (OUTPATIENT)
Dept: NEUROLOGY | Facility: CLINIC | Age: 51
End: 2021-09-01

## 2021-09-01 ENCOUNTER — LAB (OUTPATIENT)
Dept: LAB | Facility: HOSPITAL | Age: 51
End: 2021-09-01

## 2021-09-01 VITALS
WEIGHT: 200.2 LBS | DIASTOLIC BLOOD PRESSURE: 90 MMHG | HEIGHT: 72 IN | BODY MASS INDEX: 27.12 KG/M2 | SYSTOLIC BLOOD PRESSURE: 134 MMHG | HEART RATE: 92 BPM | OXYGEN SATURATION: 99 %

## 2021-09-01 DIAGNOSIS — R82.998 DARK URINE: ICD-10-CM

## 2021-09-01 DIAGNOSIS — G47.20 SLEEP PATTERN DISTURBANCE: ICD-10-CM

## 2021-09-01 DIAGNOSIS — F33.1 MODERATE EPISODE OF RECURRENT MAJOR DEPRESSIVE DISORDER (HCC): ICD-10-CM

## 2021-09-01 DIAGNOSIS — G20 PARKINSON'S DISEASE (HCC): ICD-10-CM

## 2021-09-01 DIAGNOSIS — G47.20 SLEEP PATTERN DISTURBANCE: Primary | ICD-10-CM

## 2021-09-01 DIAGNOSIS — I10 STAGE 1 HYPERTENSION: ICD-10-CM

## 2021-09-01 LAB
AMMONIA BLD-SCNC: 23 UMOL/L (ref 16–60)
AMPHET+METHAMPHET UR QL: NEGATIVE
AMPHETAMINES UR QL: NEGATIVE
BARBITURATES UR QL SCN: NEGATIVE
BASOPHILS # BLD AUTO: 0.06 10*3/MM3 (ref 0–0.2)
BASOPHILS NFR BLD AUTO: 1 % (ref 0–1.5)
BENZODIAZ UR QL SCN: NEGATIVE
BILIRUB UR QL STRIP: NEGATIVE
BUPRENORPHINE SERPL-MCNC: NEGATIVE NG/ML
CANNABINOIDS SERPL QL: NEGATIVE
CLARITY UR: CLEAR
COCAINE UR QL: NEGATIVE
COLOR UR: YELLOW
DEPRECATED RDW RBC AUTO: 42.5 FL (ref 37–54)
EOSINOPHIL # BLD AUTO: 0.04 10*3/MM3 (ref 0–0.4)
EOSINOPHIL NFR BLD AUTO: 0.7 % (ref 0.3–6.2)
ERYTHROCYTE [DISTWIDTH] IN BLOOD BY AUTOMATED COUNT: 12.2 % (ref 12.3–15.4)
GLUCOSE UR STRIP-MCNC: NEGATIVE MG/DL
HCT VFR BLD AUTO: 49 % (ref 37.5–51)
HGB BLD-MCNC: 16.1 G/DL (ref 13–17.7)
HGB UR QL STRIP.AUTO: NEGATIVE
IMM GRANULOCYTES # BLD AUTO: 0.03 10*3/MM3 (ref 0–0.05)
IMM GRANULOCYTES NFR BLD AUTO: 0.5 % (ref 0–0.5)
KETONES UR QL STRIP: ABNORMAL
LEUKOCYTE ESTERASE UR QL STRIP.AUTO: NEGATIVE
LYMPHOCYTES # BLD AUTO: 1.02 10*3/MM3 (ref 0.7–3.1)
LYMPHOCYTES NFR BLD AUTO: 17 % (ref 19.6–45.3)
MCH RBC QN AUTO: 30.8 PG (ref 26.6–33)
MCHC RBC AUTO-ENTMCNC: 32.9 G/DL (ref 31.5–35.7)
MCV RBC AUTO: 93.7 FL (ref 79–97)
METHADONE UR QL SCN: NEGATIVE
MONOCYTES # BLD AUTO: 0.56 10*3/MM3 (ref 0.1–0.9)
MONOCYTES NFR BLD AUTO: 9.3 % (ref 5–12)
NEUTROPHILS NFR BLD AUTO: 4.29 10*3/MM3 (ref 1.7–7)
NEUTROPHILS NFR BLD AUTO: 71.5 % (ref 42.7–76)
NITRITE UR QL STRIP: NEGATIVE
NRBC BLD AUTO-RTO: 0 /100 WBC (ref 0–0.2)
OPIATES UR QL: NEGATIVE
OXYCODONE UR QL SCN: NEGATIVE
PCP UR QL SCN: NEGATIVE
PH UR STRIP.AUTO: 5.5 [PH] (ref 5–8)
PLATELET # BLD AUTO: 248 10*3/MM3 (ref 140–450)
PMV BLD AUTO: 9.9 FL (ref 6–12)
PROPOXYPH UR QL: NEGATIVE
PROT UR QL STRIP: ABNORMAL
RBC # BLD AUTO: 5.23 10*6/MM3 (ref 4.14–5.8)
SP GR UR STRIP: 1.03 (ref 1–1.03)
TRICYCLICS UR QL SCN: NEGATIVE
UROBILINOGEN UR QL STRIP: ABNORMAL
WBC # BLD AUTO: 6 10*3/MM3 (ref 3.4–10.8)

## 2021-09-01 PROCEDURE — 82140 ASSAY OF AMMONIA: CPT

## 2021-09-01 PROCEDURE — 85025 COMPLETE CBC W/AUTO DIFF WBC: CPT

## 2021-09-01 PROCEDURE — 84443 ASSAY THYROID STIM HORMONE: CPT

## 2021-09-01 PROCEDURE — 36415 COLL VENOUS BLD VENIPUNCTURE: CPT

## 2021-09-01 PROCEDURE — 82746 ASSAY OF FOLIC ACID SERUM: CPT

## 2021-09-01 PROCEDURE — 93000 ELECTROCARDIOGRAM COMPLETE: CPT | Performed by: NURSE PRACTITIONER

## 2021-09-01 PROCEDURE — 80053 COMPREHEN METABOLIC PANEL: CPT

## 2021-09-01 PROCEDURE — 84436 ASSAY OF TOTAL THYROXINE: CPT

## 2021-09-01 PROCEDURE — 80074 ACUTE HEPATITIS PANEL: CPT

## 2021-09-01 PROCEDURE — 82607 VITAMIN B-12: CPT

## 2021-09-01 PROCEDURE — 99214 OFFICE O/P EST MOD 30 MIN: CPT | Performed by: NURSE PRACTITIONER

## 2021-09-01 PROCEDURE — 81001 URINALYSIS AUTO W/SCOPE: CPT

## 2021-09-01 PROCEDURE — 80306 DRUG TEST PRSMV INSTRMNT: CPT

## 2021-09-01 RX ORDER — PRAMIPEXOLE DIHYDROCHLORIDE 1 MG/1
1 TABLET ORAL 2 TIMES DAILY
Qty: 60 TABLET | Refills: 2 | Status: SHIPPED | OUTPATIENT
Start: 2021-09-01 | End: 2021-09-29 | Stop reason: DRUGHIGH

## 2021-09-01 NOTE — PROGRESS NOTES
Neuro Office Visit      Encounter Date: 2021   Patient Name: Jose Luis Mandujano  : 1970   MRN: [id number]     Chief Complaint:    Chief Complaint   Patient presents with   • Falling asleep at work   • Memory Changes   • Parkinson's Disease       History of Present Illness: Jose Luis Mandujano is a 51 y.o. 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 No injury or trauma. Has fallen asleep several times at home and work. Onset 5-6 months. Duration of each episode is 2-3 seconds. Feels super relaxed and then jerks awake. No confusion when he wakes up. If it occurs while standing his knees will buckle and he snaps awake. No falls. Doesn't feel sleepy or tired. No yawning. No history of PIETRO. Denies snoring.    Has been on pramipexole and sinemet for 4 years. No recent change in dose. Compliant with meds.     No chest pain, heart palpitations, HA. Eats reg meals. Admits to not drinking enough water through the day. One bottle a day. Drinks 4 regular Dr Pepper a day. Has dark urine for 6 months. Denies jaundice.      PD  Mirapex 1.5mg 3:30am, 1.5mg 2:30p  Sinemet 25/100 2 at 3:30am, 2 at 2:30pm, 2 at 8:00pm       MDD  Stable on Zoloft. Lives with girlfriend.    Memory Loss concern  Recently called his girlfriend by his ex-wife's name. Has been forgetting little things. Has not been lost. Remembers family members names. Executive function intact.    Problem History:  Previously followed by Dr Baker and last seen by Dr Baker on 16 renewed propranolol.  Initial eval with left hand tremor for several months starting in late .  Tremor has now spread to left leg and developing left sided limp.  Slowing movements of left arm and leg.       Reviewed evaluation by Dr Kit Kunz on 16   Action tremor in left hand with onset  4 years ago.  Noticed at first with smoking a cigarette.   Started on primidone on 9/6/16.  Stopped due to CNS side effects.       MRI C-spine 3/4/16 C6/7 disc to the left   Diagnosed with PD 1/6/17   Subjective      Past Medical History:   Past Medical History:   Diagnosis Date   • Moderate episode of recurrent major depressive disorder (CMS/[facility]) 1/6/2017    Formatting of this note might be different from the original. Last Assessment & Plan:  Psychological condition is improving with treatment. Continue current treatment regimen. Psychological condition  will be reassessed at the next regular appointment.   • Parkinson's disease (CMS/[facility]) 12/15/2016    Formatting of this note might be different from the original. Last Assessment & Plan:  Sx stable on Sinemet, Artane, Mirapex       Past Surgical History: History reviewed. No pertinent surgical history.    Family History:   Family History   Problem Relation Age of Onset   • Heart disease Father         cardiac disorder   • Diabetes Father    • Hypertension Father    • Alcohol abuse Brother    • Mental illness Other    • Stroke Other        Social History:   Social History     Socioeconomic History   • Marital status:      Spouse name: Not on file   • Number of children: Not on file   • Years of education: Not on file   • Highest education level: Not on file   Tobacco Use   • Smoking status: Former Smoker   • Smokeless tobacco: Never Used   Vaping Use   • Vaping Use: Never used   Substance and Sexual Activity   • Alcohol use: No   • Drug use: No   • Sexual activity: Defer       Medications:     Current Outpatient Medications:   •  sertraline (ZOLOFT) 100 MG tablet, Take 1 tablet by mouth Daily., Disp: 90 tablet, Rfl: 3  •  trihexyphenidyl (ARTANE) 2 MG tablet, Take 2 tablets by mouth 3 (Three) Times a Day., Disp: 540 tablet, Rfl: 3  •  carbidopa-levodopa (SINEMET)  MG per tablet, 1 tab in am 2 at 2:30pm 2 at 8pm, Disp: 150 tablet, Rfl: 2  •  pramipexole  (Mirapex) 1 MG tablet, Take 1 tablet by mouth 2 (two) times a day., Disp: 60 tablet, Rfl: 2    Allergies:   No Known Allergies        Objective     Physical Exam:   Physical Exam  Eyes:      Pupils: Pupils are equal, round, and reactive to light.   Neurological:      Mental Status: He is oriented to person, place, and time.      Coordination: Finger-Nose-Finger Test, Heel to Shin Test and Romberg Test normal.      Gait: Gait is intact.      Deep Tendon Reflexes:      Reflex Scores:       Tricep reflexes are 2+ on the right side and 2+ on the left side.       Bicep reflexes are 2+ on the right side and 2+ on the left side.       Brachioradialis reflexes are 2+ on the right side and 2+ on the left side.       Patellar reflexes are 2+ on the right side and 2+ on the left side.       Achilles reflexes are 2+ on the right side and 2+ on the left side.  Psychiatric:         Speech: Speech normal.         Neurologic Exam     Mental Status   Oriented to person, place, and time.   Follows 3 step commands.   Attention: normal. Concentration: normal.   Speech: speech is normal   Level of consciousness: alert  Knowledge: consistent with education.   Normal comprehension.     Cranial Nerves     CN III, IV, VI   Pupils are equal, round, and reactive to light.  Right pupil: Accommodation: intact.   Left pupil: Accommodation: intact.   CN III: no CN III palsy  CN VI: no CN VI palsy  Nystagmus: none   Diplopia: none  Upgaze: normal  Downgaze: normal  Conjugate gaze: present    CN VII   Facial expression full, symmetric.     CN VIII   Hearing: intact    CN XII   CN XII normal.     Motor Exam   Muscle bulk: normal  Overall muscle tone: normal    Strength   Right biceps: 5/5  Left biceps: 5/5  Right triceps: 5/5  Left triceps: 5/5  Right interossei: 5/5  Left interossei: 5/5  Right quadriceps: 5/5  Left quadriceps: 5/5  Right anterior tibial: 5/5  Left anterior tibial: 5/5  Right posterior tibial: 5/5  Left posterior tibial:  "5/5    Sensory Exam   Light touch normal.     Gait, Coordination, and Reflexes     Gait  Gait: normal    Coordination   Romberg: negative  Finger to nose coordination: normal  Heel to shin coordination: normal    Tremor   Resting tremor: absent  Action tremor: absent    Reflexes   Right brachioradialis: 2+  Left brachioradialis: 2+  Right biceps: 2+  Left biceps: 2+  Right triceps: 2+  Left triceps: 2+  Right patellar: 2+  Left patellar: 2+  Right achilles: 2+  Left achilles: 2+  Right : 2+  Left : 2+         Vital Signs:   Vitals:    09/01/21 1052 09/01/21 1226   BP: (!) 134/104 134/90   Pulse: 92    SpO2: 99%    Weight: 90.8 kg (200 lb 3.2 oz)    Height: 182.9 cm (72.01\")      Body mass index is 27.15 kg/m².       ECG 12 Lead    Date/Time: 9/1/2021 5:47 PM  Performed by: Idalmis Ugarte DNP, APRN  Authorized by: Idalmis Ugarte DNP, JOSE L   Comparison: not compared with previous ECG   Rhythm: sinus rhythm  Rate: normal  BPM: 70  Conduction: conduction normal  ST Segments: ST segments normal  T Waves: T waves normal  QRS axis: normal  Other: no other findings    Clinical impression: normal ECG           MRI Brain With & Without Contrast (12/21/2016 09:21)      MMSE 30/30    Assessment / Plan      Assessment/Plan:      Diagnoses and all orders for this visit:    1. Sleep pattern disturbance (Primary)  Comments:  Both Sinemet and Mirapex can cause sudden sleep, though he has been on both of meds for some time. No work until follow up in 3 weeks. Check labs, EKG, holter  Orders:  -     Comprehensive Metabolic Panel; Future  -     CBC Auto Differential; Future  -     TSH; Future  -     T4; Future  -     Urine Drug Screen - Urine, Clean Catch; Future  -     Holter Monitor - 24 Hour; Future  -     Hepatitis Panel, Acute; Future  -     Urinalysis With Microscopic - Urine, Clean Catch; Future  -     Vitamin B12 & Folate; Future  -     Ammonia; Future  -     pramipexole (Mirapex) 1 MG tablet; Take 1 " tablet by mouth 2 (two) times a day.  Dispense: 60 tablet; Refill: 2  -     carbidopa-levodopa (SINEMET)  MG per tablet; 1 tab in am 2 at 2:30pm 2 at 8pm  Dispense: 150 tablet; Refill: 2  -     Cancel: ECG 12 Lead; Future  -     ECG 12 Lead    2. Parkinson's disease (CMS/[facility])  Comments:  Decrease Mirapex to 1mg bid, Decrease sinemet to 1 tab in am, 2 @ 2:30, 2 @ 8pm. Anticipate increased tremor. Anticipate increased tremor.  Orders:  -     Comprehensive Metabolic Panel; Future  -     CBC Auto Differential; Future  -     TSH; Future  -     T4; Future  -     Urine Drug Screen - Urine, Clean Catch; Future  -     Holter Monitor - 24 Hour; Future  -     Hepatitis Panel, Acute; Future  -     Urinalysis With Microscopic - Urine, Clean Catch; Future  -     Vitamin B12 & Folate; Future  -     Ammonia; Future  -     pramipexole (Mirapex) 1 MG tablet; Take 1 tablet by mouth 2 (two) times a day.  Dispense: 60 tablet; Refill: 2  -     carbidopa-levodopa (SINEMET)  MG per tablet; 1 tab in am 2 at 2:30pm 2 at 8pm  Dispense: 150 tablet; Refill: 2    3. Moderate episode of recurrent major depressive disorder (CMS/[facility])  -     Comprehensive Metabolic Panel; Future  -     CBC Auto Differential; Future  -     TSH; Future  -     T4; Future  -     Urine Drug Screen - Urine, Clean Catch; Future  -     Vitamin B12 & Folate; Future    4. Dark urine  -     Urinalysis With Microscopic - Urine, Clean Catch; Future    5. Stage 1 hypertension  Comments:  Monitor bp at home. Low sodium diet.    Other orders  -     Cancel: Incision & Drainage          Patient Education:   Off work for 3 weeks until diagnostics return. Will fill out FMLA papers. Encouraged him to est care with PCP for wellness exam, ca screenings and lipids.    Follow Up:   Return in about 3 weeks (around 9/22/2021) for Recheck.    Reviewed medications, potential side effects and signs and symptoms to report. Discussed risk versus benefits of treatment plan with patient  and/or family-including medications, labs and radiology that may be ordered. Addressed questions and concerns during visit. Patient and/or family verbalized understanding and agree with plan. Instructed to call the office with any questions and report to ER with any life-threatening symptoms.     During this visit the following were done:  Labs Reviewed []    Labs Ordered [x]    Radiology Reports Reviewed [x]    Radiology Ordered []    PCP Records Reviewed []    Referring Provider Records Reviewed []    ER Records Reviewed []    Hospital Records Reviewed []    History Obtained From Family []    Radiology Images Reviewed []    Other Reviewed [x]    Records Requested []      Idalmis Ugarte, DNP, APRN

## 2021-09-02 ENCOUNTER — TELEPHONE (OUTPATIENT)
Dept: NEUROLOGY | Facility: CLINIC | Age: 51
End: 2021-09-02

## 2021-09-02 LAB
ALBUMIN SERPL-MCNC: 4.6 G/DL (ref 3.5–5.2)
ALBUMIN/GLOB SERPL: 1.8 G/DL
ALP SERPL-CCNC: 86 U/L (ref 39–117)
ALT SERPL W P-5'-P-CCNC: <5 U/L (ref 1–41)
ANION GAP SERPL CALCULATED.3IONS-SCNC: 11.4 MMOL/L (ref 5–15)
AST SERPL-CCNC: 15 U/L (ref 1–40)
BACTERIA UR QL AUTO: NORMAL /HPF
BILIRUB SERPL-MCNC: 0.5 MG/DL (ref 0–1.2)
BUN SERPL-MCNC: 13 MG/DL (ref 6–20)
BUN/CREAT SERPL: 15.7 (ref 7–25)
CALCIUM SPEC-SCNC: 9.3 MG/DL (ref 8.6–10.5)
CHLORIDE SERPL-SCNC: 102 MMOL/L (ref 98–107)
CO2 SERPL-SCNC: 24.6 MMOL/L (ref 22–29)
CREAT SERPL-MCNC: 0.83 MG/DL (ref 0.76–1.27)
FOLATE SERPL-MCNC: 8.45 NG/ML (ref 4.78–24.2)
GFR SERPL CREATININE-BSD FRML MDRD: 97 ML/MIN/1.73
GLOBULIN UR ELPH-MCNC: 2.5 GM/DL
GLUCOSE SERPL-MCNC: 95 MG/DL (ref 65–99)
HAV IGM SERPL QL IA: NORMAL
HBV CORE IGM SERPL QL IA: NORMAL
HBV SURFACE AG SERPL QL IA: NORMAL
HCV AB SER DONR QL: NORMAL
HYALINE CASTS UR QL AUTO: NORMAL /LPF
POTASSIUM SERPL-SCNC: 4.1 MMOL/L (ref 3.5–5.2)
PROT SERPL-MCNC: 7.1 G/DL (ref 6–8.5)
RBC # UR: NORMAL /HPF
REF LAB TEST METHOD: NORMAL
SODIUM SERPL-SCNC: 138 MMOL/L (ref 136–145)
SQUAMOUS #/AREA URNS HPF: NORMAL /HPF
T4 SERPL-MCNC: 5.07 MCG/DL (ref 4.5–11.7)
TSH SERPL DL<=0.05 MIU/L-ACNC: 1.06 UIU/ML (ref 0.27–4.2)
VIT B12 BLD-MCNC: 282 PG/ML (ref 211–946)
WBC UR QL AUTO: NORMAL /HPF

## 2021-09-02 NOTE — TELEPHONE ENCOUNTER
----- Message from Idalmis Ugarte, RC, APRN sent at 9/2/2021  3:27 PM EDT -----  Please let patient know his labs show he is slightly dehydrated. Kidney, liver, thyroid, hepatitis panel, vit B12 were normal. No signs of infection or anemia.

## 2021-09-09 ENCOUNTER — TELEPHONE (OUTPATIENT)
Dept: NEUROLOGY | Facility: CLINIC | Age: 51
End: 2021-09-09

## 2021-09-09 NOTE — TELEPHONE ENCOUNTER
Provider: DR. DAIGLE  Caller: GRACE WITH [employer]    Patient: KURT REED :1970        Reason for Call: GRACE WITH [employer] CALLING NEEDING THE FOLLOWING INFO:  1. DIAGNOSIS THAT HAS THE PT OFF WORK   2. MAYBE ESTIMATE RETURN TIME BACK TO WORK  3. THE SYMPTOMS THAT IS PREVENTING HIM TO RETURN TO WORK.    PLEASE CALL HER BACK [phone]    CLAIM# [id number]

## 2021-09-10 NOTE — TELEPHONE ENCOUNTER
Notified Riley his 3 weeks off work until recheck was approved by Ronny taylor. He states appreciation and will see us back at his follow up for recheck and we can go from there.

## 2021-09-10 NOTE — TELEPHONE ENCOUNTER
Called [employer] back and typed in Radha's extension but actually spoke with Jonny.    Based on the answers I provided he has approved  to remain off work for 3 weeks until we have rechecked him/diagnostics return on 9.22.21 for his follow up.    I had also received paperwork, filled it out and faxed it to the [employer] including his office note, labs and EKG-still awaiting Holter monitor results, but Jonny as he approved it based on our conversation the paperwork was not necessary, however he can always just put it on file if they need it in the future for some reason. Sounds good. Thanks!

## 2021-09-23 ENCOUNTER — TELEPHONE (OUTPATIENT)
Dept: NEUROLOGY | Facility: CLINIC | Age: 51
End: 2021-09-23

## 2021-09-23 NOTE — TELEPHONE ENCOUNTER
----- Message from Idalmis Ugarte, RC, APRN sent at 9/23/2021  4:24 PM EDT -----  Please let him know his heart monitor showed no abnormal rhythms

## 2021-09-29 ENCOUNTER — LAB (OUTPATIENT)
Dept: LAB | Facility: HOSPITAL | Age: 51
End: 2021-09-29

## 2021-09-29 ENCOUNTER — OFFICE VISIT (OUTPATIENT)
Dept: NEUROLOGY | Facility: CLINIC | Age: 51
End: 2021-09-29

## 2021-09-29 VITALS
TEMPERATURE: 98.7 F | BODY MASS INDEX: 26.28 KG/M2 | DIASTOLIC BLOOD PRESSURE: 82 MMHG | SYSTOLIC BLOOD PRESSURE: 128 MMHG | WEIGHT: 194 LBS | HEART RATE: 88 BPM | OXYGEN SATURATION: 98 % | HEIGHT: 72 IN

## 2021-09-29 DIAGNOSIS — F95.9 TIC DISORDER: ICD-10-CM

## 2021-09-29 DIAGNOSIS — G47.20 SLEEP PATTERN DISTURBANCE: ICD-10-CM

## 2021-09-29 DIAGNOSIS — G20 PARKINSON'S DISEASE (HCC): ICD-10-CM

## 2021-09-29 DIAGNOSIS — R41.3 AMNESIA: ICD-10-CM

## 2021-09-29 DIAGNOSIS — R41.82 ALTERED MENTAL STATUS, UNSPECIFIED ALTERED MENTAL STATUS TYPE: ICD-10-CM

## 2021-09-29 DIAGNOSIS — G20 PARKINSON'S DISEASE (HCC): Primary | ICD-10-CM

## 2021-09-29 LAB
AMPHET+METHAMPHET UR QL: NEGATIVE
AMPHETAMINES UR QL: NEGATIVE
BARBITURATES UR QL SCN: NEGATIVE
BENZODIAZ UR QL SCN: NEGATIVE
BUPRENORPHINE SERPL-MCNC: NEGATIVE NG/ML
CANNABINOIDS SERPL QL: NEGATIVE
CERULOPLASMIN SERPL-MCNC: 24 MG/DL (ref 16–31)
CHROMATIN AB SERPL-ACNC: <10 IU/ML (ref 0–14)
CK SERPL-CCNC: 117 U/L (ref 20–200)
COCAINE UR QL: NEGATIVE
ERYTHROCYTE [SEDIMENTATION RATE] IN BLOOD: 10 MM/HR (ref 0–20)
HBA1C MFR BLD: 4.8 % (ref 4.8–5.6)
METHADONE UR QL SCN: NEGATIVE
OPIATES UR QL: NEGATIVE
OXYCODONE UR QL SCN: NEGATIVE
PCP UR QL SCN: NEGATIVE
PROPOXYPH UR QL: NEGATIVE
TRICYCLICS UR QL SCN: NEGATIVE

## 2021-09-29 PROCEDURE — 86431 RHEUMATOID FACTOR QUANT: CPT

## 2021-09-29 PROCEDURE — 80306 DRUG TEST PRSMV INSTRMNT: CPT

## 2021-09-29 PROCEDURE — 82550 ASSAY OF CK (CPK): CPT

## 2021-09-29 PROCEDURE — 85652 RBC SED RATE AUTOMATED: CPT

## 2021-09-29 PROCEDURE — 83036 HEMOGLOBIN GLYCOSYLATED A1C: CPT

## 2021-09-29 PROCEDURE — 84446 ASSAY OF VITAMIN E: CPT

## 2021-09-29 PROCEDURE — 36415 COLL VENOUS BLD VENIPUNCTURE: CPT

## 2021-09-29 PROCEDURE — 86038 ANTINUCLEAR ANTIBODIES: CPT

## 2021-09-29 PROCEDURE — 82542 COL CHROMOTOGRAPHY QUAL/QUAN: CPT

## 2021-09-29 PROCEDURE — 82390 ASSAY OF CERULOPLASMIN: CPT

## 2021-09-29 PROCEDURE — 99214 OFFICE O/P EST MOD 30 MIN: CPT | Performed by: NURSE PRACTITIONER

## 2021-09-29 PROCEDURE — 86592 SYPHILIS TEST NON-TREP QUAL: CPT

## 2021-09-29 RX ORDER — PRAMIPEXOLE DIHYDROCHLORIDE 1.5 MG/1
1.5 TABLET ORAL 3 TIMES DAILY
Qty: 90 TABLET | Refills: 2 | Status: SHIPPED | OUTPATIENT
Start: 2021-09-29 | End: 2022-08-09 | Stop reason: DRUGHIGH

## 2021-09-29 NOTE — PROGRESS NOTES
Neuro Office Visit      Encounter Date: 2021   Patient Name: Jose Luis Mandujano  : 1970   MRN: [id number]   PCP: Dr Santana  Chief Complaint:    Chief Complaint   Patient presents with   • Parkinson's Disease       History of Present Illness: Jose Luis Mandujano is a 51 y.o. male who is here today in Neurology for PD     21-no showed appt    Last appt 21-Disturbed sleep-decrease mirapex, and sinemet, check labs, ECG, holter  Holter-neg  Labs-NCA  US+ ketones    Altered mental status  Has had 3 episodes of amnesia. He does not pass out, but can't recall the episode. He thinks he is functioning properly but time is erased. Recalls 3 episodes in his life. 1st episode was 20 minutes, 2 nd episode was 2 hours. Last episode 21 while at home, 2 hour space of time was erased. He can't recall what he was doing before or after the episode. He was sitting in the chair when he became aware of his surroundings. No history of sz or witnessed sz activity.    Complains of weight loss, appetite is waxing and waning, dry mouth, keeps clearing his throat, voice is hoarse. Feels like he needs to roll his shoulder and suck in air. Has been twitching.  He can make it stop. Symptoms started one to two weeks ago.    PD  We decreased his dose of sinemet and mirapex for symptoms of sudden sleep. He feels like is tremor is slightly worse but has developed tics with shoulder rolling, sucking in air and clearing his throat.      MDD  Taking Zoloft. Feels more anxious. Has been off of work for 3 weeks for these episodes. He feels he cannot return to work.      Subjective      Past Medical History:   Past Medical History:   Diagnosis Date   • Moderate episode of recurrent major depressive disorder (CMS/HCC) 2017    Formatting of this note might be different from the original. Last Assessment & Plan:  Psychological condition is improving with treatment. Continue current treatment regimen. Psychological condition  will be  reassessed at the next regular appointment.   • Parkinson's disease (CMS/[facility]) 12/15/2016    Formatting of this note might be different from the original. Last Assessment & Plan:  Sx stable on Sinemet, Artane, Mirapex       Past Surgical History: History reviewed. No pertinent surgical history.    Family History:   Family History   Problem Relation Age of Onset   • Heart disease Father         cardiac disorder   • Diabetes Father    • Hypertension Father    • Alcohol abuse Brother    • Mental illness Other    • Stroke Other        Social History:   Social History     Socioeconomic History   • Marital status:      Spouse name: Not on file   • Number of children: Not on file   • Years of education: Not on file   • Highest education level: Not on file   Tobacco Use   • Smoking status: Former Smoker   • Smokeless tobacco: Never Used   Vaping Use   • Vaping Use: Never used   Substance and Sexual Activity   • Alcohol use: No   • Drug use: No   • Sexual activity: Defer       Medications:     Current Outpatient Medications:   •  carbidopa-levodopa (SINEMET)  MG per tablet, 2 tab in am 2 at 2:30pm 2 at 8pm, Disp: 150 tablet, Rfl: 2  •  sertraline (ZOLOFT) 100 MG tablet, Take 1 tablet by mouth Daily., Disp: 90 tablet, Rfl: 3  •  trihexyphenidyl (ARTANE) 2 MG tablet, Take 2 tablets by mouth 3 (Three) Times a Day., Disp: 540 tablet, Rfl: 3  •  pramipexole (MIRAPEX) 1.5 MG tablet, Take 1 tablet by mouth 3 (Three) Times a Day., Disp: 90 tablet, Rfl: 2    Allergies:   No Known Allergies    PHQ-9 Total Score:     STEADI Fall Risk Assessment has not been completed.    Objective     Physical Exam:   Physical Exam  Eyes:      Pupils: Pupils are equal, round, and reactive to light.   Neurological:      Mental Status: He is oriented to person, place, and time.      Coordination: Finger-Nose-Finger Test abnormal. Heel to Shin Test and Romberg Test normal.      Gait: Gait is intact. Tandem walk normal.      Deep Tendon  Reflexes:      Reflex Scores:       Tricep reflexes are 2+ on the right side and 2+ on the left side.       Bicep reflexes are 2+ on the right side and 2+ on the left side.       Brachioradialis reflexes are 2+ on the right side and 2+ on the left side.       Patellar reflexes are 2+ on the right side and 2+ on the left side.       Achilles reflexes are 2+ on the right side and 2+ on the left side.  Psychiatric:         Speech: Speech normal.         Neurologic Exam     Mental Status   Oriented to person, place, and time.   Follows 3 step commands.   Attention: normal. Concentration: normal.   Speech: speech is normal   Level of consciousness: alert  Knowledge: consistent with education.   Normal comprehension.     Cranial Nerves     CN III, IV, VI   Pupils are equal, round, and reactive to light.  Right pupil: Accommodation: intact.   Left pupil: Accommodation: intact.   CN III: no CN III palsy  CN VI: no CN VI palsy  Nystagmus: none   Diplopia: none  Upgaze: normal  Downgaze: normal  Conjugate gaze: present    CN VII   Facial expression full, symmetric.     CN VIII   Hearing: intact    CN XII   CN XII normal.     Motor Exam   Muscle bulk: normal  Overall muscle tone: normal    Strength   Right biceps: 5/5  Left biceps: 5/5  Right triceps: 5/5  Left triceps: 5/5  Right interossei: 5/5  Left interossei: 5/5  Right quadriceps: 5/5  Left quadriceps: 5/5  Right anterior tibial: 5/5  Left anterior tibial: 5/5  Right posterior tibial: 5/5  Left posterior tibial: 5/5    Sensory Exam   Light touch normal.     Gait, Coordination, and Reflexes     Gait  Gait: normal    Coordination   Romberg: negative  Finger to nose coordination: abnormal  Heel to shin coordination: normal  Tandem walking coordination: normal    Tremor   Resting tremor: absent  Action tremor: left arm and right arm    Reflexes   Right brachioradialis: 2+  Left brachioradialis: 2+  Right biceps: 2+  Left biceps: 2+  Right triceps: 2+  Left triceps: 2+  Right  "patellar: 2+  Left patellar: 2+  Right achilles: 2+  Left achilles: 2+  Right : 2+  Left : 2+Left shoulder rolling and throat clearing during entire visit.        Vital Signs:   Vitals:    09/29/21 0941   BP: 128/82   Pulse: 88   Temp: 98.7 °F (37.1 °C)   SpO2: 98%   Weight: 88 kg (194 lb 0.1 oz)   Height: 182.9 cm (72.01\")     Body mass index is 26.31 kg/m².       Assessment / Plan      Assessment/Plan:   Diagnoses and all orders for this visit:    1. Parkinson's disease (CMS/[facility]) (Primary)  Comments:  Will resume his previous dose and directions of sinemet and mirapex. Anticipate tremor will resolve as before.  Orders:  -     Ceruloplasmin; Future  -     Vitamin E; Future  -     Fatty Acids, Essential; Future  -     CK; Future  -     Hemoglobin A1c; Future  -     Rheumatoid Factor; Future  -     Urine Drug Screen - Urine, Clean Catch; Future  -     VIMAL by IFA, Reflex 9-biomarkers profile; Future  -     MRI Brain With & Without Contrast; Future  -     EEG Awake or Drowsy Routine; Future  -     carbidopa-levodopa (SINEMET)  MG per tablet; 2 tab in am 2 at 2:30pm 2 at 8pm  Dispense: 150 tablet; Refill: 2  -     pramipexole (MIRAPEX) 1.5 MG tablet; Take 1 tablet by mouth 3 (Three) Times a Day.  Dispense: 90 tablet; Refill: 2    2. Amnesia  Comments:  Consider EEG  Orders:  -     Ceruloplasmin; Future  -     Vitamin E; Future  -     Fatty Acids, Essential; Future  -     CK; Future  -     Hemoglobin A1c; Future  -     Rheumatoid Factor; Future  -     Urine Drug Screen - Urine, Clean Catch; Future  -     VIMAL by IFA, Reflex 9-biomarkers profile; Future  -     RPR; Future  -     Sedimentation Rate; Future  -     MRI Brain With & Without Contrast; Future  -     EEG Awake or Drowsy Routine; Future    3. Altered mental status, unspecified altered mental status type  -     MRI Brain With & Without Contrast; Future  -     EEG Awake or Drowsy Routine; Future    4. Sleep pattern disturbance  Comments:  Both Sinemet " and Mirapex can cause sudden sleep, though he has been on both of meds for some time.  Orders:  -     carbidopa-levodopa (SINEMET)  MG per tablet; 2 tab in am 2 at 2:30pm 2 at 8pm  Dispense: 150 tablet; Refill: 2    5. Parkinson's disease (HCC)  Comments:  increase Mirapex to 1.5mg bid, Increase sinemet to 2 tab in am, 2 @ 2:30, 2 @ 8pm.   Orders:  -     Ceruloplasmin; Future  -     Vitamin E; Future  -     Fatty Acids, Essential; Future  -     CK; Future  -     Hemoglobin A1c; Future  -     Rheumatoid Factor; Future  -     Urine Drug Screen - Urine, Clean Catch; Future  -     VIMAL by IFA, Reflex 9-biomarkers profile; Future  -     MRI Brain With & Without Contrast; Future  -     EEG Awake or Drowsy Routine; Future  -     carbidopa-levodopa (SINEMET)  MG per tablet; 2 tab in am 2 at 2:30pm 2 at 8pm  Dispense: 150 tablet; Refill: 2  -     pramipexole (MIRAPEX) 1.5 MG tablet; Take 1 tablet by mouth 3 (Three) Times a Day.  Dispense: 90 tablet; Refill: 2    6. Tic disorder  Comments:  Consider TD with history of SSRI           Patient Education:   Reviewed medications, potential side effects and signs and symptoms to report. Discussed risk versus benefits of treatment plan with patient and/or family-including medications, labs and radiology that may be ordered. Addressed questions and concerns during visit. Patient and/or family verbalized understanding and agree with plan. Instructed to call the office with any questions and report to ER with any life-threatening symptoms.     Follow Up:   After tests with Dr Louie    During this visit the following were done:  Labs Reviewed [x]    Labs Ordered [x]    Radiology Reports Reviewed []    Radiology Ordered [x]    PCP Records Reviewed []    Referring Provider Records Reviewed []    ER Records Reviewed []    Hospital Records Reviewed []    History Obtained From Family []    Radiology Images Reviewed []    Other Reviewed [x]    Records Requested []      Idalmis CHRISTIANSON  Torito, DNP, APRN

## 2021-09-30 LAB
ANA TITR SER IF: NEGATIVE {TITER}
LABORATORY COMMENT REPORT: NORMAL
RPR SER QL: NORMAL

## 2021-10-04 LAB
A-TOCOPHEROL VIT E SERPL-MCNC: 9.6 MG/L (ref 7–25.1)
GAMMA TOCOPHEROL SERPL-MCNC: 1.4 MG/L (ref 0.5–5.5)

## 2021-10-06 LAB
A-LINOLENATE SERPL-SCNC: 47 NMOL/ML (ref 20–200)
AA SERPL-SCNC: 684 NMOL/ML (ref 310–1420)
ARACHIDATE SERPL-SCNC: 24 NMOL/ML (ref 8–43)
DHA SERPL-SCNC: 91 NMOL/ML (ref 45–365)
DOCOSAPENTAENATE W6 SERPL-SCNC: 19 NMOL/ML (ref 6–55)
DOCOSATETRAENOATE SERPL-SCNC: 23 NMOL/ML (ref 10–40)
DOCOSENOATE SERPL-SCNC: 4 NMOL/ML (ref 1–10)
DPA SERPL-SCNC: 38 NMOL/ML (ref 13–75)
EPA SERPL-SCNC: 41 NMOL/ML (ref 8–130)
FA SERPL-SCNC: 11.2 MMOL/L (ref 4.5–15)
FATTY ACIDS PATTERN SERPL-IMP: NORMAL
G-LINOLENATE SERPL-SCNC: 56 NMOL/ML (ref 10–120)
HEXADECENOATE SERPL-SCNC: 40 NMOL/ML (ref 14–95)
HOMO-G LINOLENATE SERPL-SCNC: 195 NMOL/ML (ref 45–340)
LAURATE SERPL-SCNC: 6 NMOL/ML (ref 1–200)
LINOLEATE SERPL-SCNC: 3094 NMOL/ML (ref 1210–4300)
MEAD ACID SERPL-SCNC: 15 NMOL/ML (ref 1–35)
MONOUNSAT FA SERPL-SCNC: 3.2 MMOL/L (ref 0.9–4.7)
MYRISTATE SERPL-SCNC: 112 NMOL/ML (ref 20–520)
NERVONATE SERPL-SCNC: 109 NMOL/ML (ref 35–145)
OCTADECANOATE SERPL-SCNC: 675 NMOL/ML (ref 280–1250)
OLEATE SERPL-SCNC: 2520 NMOL/ML (ref 740–3900)
PALMITATE SERPL-SCNC: 2890 NMOL/ML (ref 1090–3840)
PALMITOLEATE SERPL-SCNC: 329 NMOL/ML (ref 35–580)
PATHOLOGY STUDY: NORMAL
POLYUNSAT FA SERPL-SCNC: 4.3 MMOL/L (ref 2.1–6.2)
REPORT: NORMAL
SAT FA SERPL-SCNC: 3.7 MMOL/L (ref 1.5–5.3)
TRIENOATE/AA SERPL-SRTO: 0.02 {RATIO} (ref 0–0.05)
VACCENATE SERPL-SCNC: 193 NMOL/ML (ref 50–250)
W3 FA SERPL-SCNC: 0.22 MMOL/L (ref 0.12–0.55)
W6 FA SERPL-SCNC: 4.1 MMOL/L (ref 1.8–5.7)

## 2021-10-07 ENCOUNTER — TELEPHONE (OUTPATIENT)
Dept: NEUROLOGY | Facility: CLINIC | Age: 51
End: 2021-10-07

## 2021-10-07 NOTE — TELEPHONE ENCOUNTER
----- Message from Idalmis Ugarte, RC, APRN sent at 10/7/2021  8:11 AM EDT -----  Please let him know his labs are normal. Vit D, diabetes, rheumatoid arthritis, copper level, muscle breakdown, connective tissue autoimmune disorder panel are all normal.

## 2021-10-26 ENCOUNTER — HOSPITAL ENCOUNTER (OUTPATIENT)
Dept: MRI IMAGING | Facility: HOSPITAL | Age: 51
Discharge: HOME | End: 2021-10-26
Admitting: NURSE PRACTITIONER

## 2021-10-26 DIAGNOSIS — G20 PARKINSON'S DISEASE (HCC): ICD-10-CM

## 2021-10-26 DIAGNOSIS — R41.82 ALTERED MENTAL STATUS, UNSPECIFIED ALTERED MENTAL STATUS TYPE: ICD-10-CM

## 2021-10-26 DIAGNOSIS — R41.3 AMNESIA: ICD-10-CM

## 2021-10-26 PROCEDURE — 0 GADOBENATE DIMEGLUMINE 529 MG/ML SOLUTION: Performed by: NURSE PRACTITIONER

## 2021-10-26 PROCEDURE — A9577 INJ MULTIHANCE: HCPCS | Performed by: NURSE PRACTITIONER

## 2021-10-26 PROCEDURE — 70553 MRI BRAIN STEM W/O & W/DYE: CPT

## 2021-10-26 RX ADMIN — GADOBENATE DIMEGLUMINE 17 ML: 529 INJECTION, SOLUTION INTRAVENOUS at 09:41

## 2021-10-27 ENCOUNTER — TELEPHONE (OUTPATIENT)
Dept: NEUROLOGY | Facility: CLINIC | Age: 51
End: 2021-10-27

## 2021-10-27 NOTE — TELEPHONE ENCOUNTER
----- Message from Idalmis Ugarte, RC, APRN sent at 10/26/2021  5:55 PM EDT -----  Please let him know the MRI of his brain is normal.

## 2021-11-11 ENCOUNTER — HOSPITAL ENCOUNTER (OUTPATIENT)
Dept: NEUROLOGY | Facility: HOSPITAL | Age: 51
Discharge: HOME | End: 2021-11-11
Admitting: NURSE PRACTITIONER

## 2021-11-11 DIAGNOSIS — R41.3 AMNESIA: ICD-10-CM

## 2021-11-11 DIAGNOSIS — R41.82 ALTERED MENTAL STATUS, UNSPECIFIED ALTERED MENTAL STATUS TYPE: ICD-10-CM

## 2021-11-11 DIAGNOSIS — G20 PARKINSON'S DISEASE (HCC): ICD-10-CM

## 2021-11-11 PROCEDURE — 95819 EEG AWAKE AND ASLEEP: CPT

## 2021-11-18 ENCOUNTER — OFFICE VISIT (OUTPATIENT)
Dept: NEUROLOGY | Facility: CLINIC | Age: 51
End: 2021-11-18

## 2021-11-18 VITALS
HEART RATE: 82 BPM | HEIGHT: 72 IN | SYSTOLIC BLOOD PRESSURE: 124 MMHG | OXYGEN SATURATION: 98 % | TEMPERATURE: 98.6 F | BODY MASS INDEX: 25.19 KG/M2 | WEIGHT: 186 LBS | DIASTOLIC BLOOD PRESSURE: 80 MMHG

## 2021-11-18 DIAGNOSIS — F33.1 MODERATE EPISODE OF RECURRENT MAJOR DEPRESSIVE DISORDER (HCC): Chronic | ICD-10-CM

## 2021-11-18 DIAGNOSIS — G20 PARKINSON'S DISEASE (HCC): Primary | Chronic | ICD-10-CM

## 2021-11-18 PROCEDURE — 99214 OFFICE O/P EST MOD 30 MIN: CPT | Performed by: PSYCHIATRY & NEUROLOGY

## 2021-11-18 NOTE — PROGRESS NOTES
"Chief Complaint  Parkinson's Disease    Subjective          Jose Luis Mandujano presents to [facility] NEUROLOGY     History of Present Illness    51 y.o. male returns in follow up.  Last visit on 9/29/21 ordered MRI Brain and EEG, continued Mirapex, Sinemet, Zoloft, Artane.     MRI Brain, my review of films, normal  EEG - normal     PD     Sinemet 25/100 2 tab 4 am, 2 tab 1:30 pm;  1 tab 8 pm  Mirapex 1.5 mg 3 am; 1.5 mg 1:30 pm  Artane  4 mg 3 am; 4 mg 1:30 pm. 2 - 4 mg 8 pm     Increase jerking and tics of left shoulder.  Falling asleep on .      Last four months worsening movements.      Moving constantly in sleep.     Working full time without limitations.      Denies tremors.       Few episodes of depersonalization.      MDD     Mood is stable on Zoloft.      PMH:     Previously followed by Dr Baker and last seen by Dr Baker on 2/9/16 renewed propranolol.  Initial eval with left hand tremor for several months starting in late 2014.  Tremor has now spread to left leg and developing left sided limp.  Slowing movements of left arm and leg.       Reviewed evaluation by Dr Kit Kunz on 9/6/16   Action tremor in left hand with onset 4 years ago.  Noticed at first with smoking a cigarette.   Started on primidone on 9/6/16.  Stopped due to CNS side effects.       MRI C-spine, my review, 3/4/16 C6/7 disc to the left      Objective   Vital Signs:   /80   Pulse 82   Temp 98.6 °F (37 °C)   Ht 182.9 cm (72.01\")   Wt 84.4 kg (186 lb)   SpO2 98%   BMI 25.22 kg/m²     Physical Exam  Eyes:      Extraocular Movements: EOM normal.      Pupils: Pupils are equal, round, and reactive to light.   Neurological:      Mental Status: He is oriented to person, place, and time.      Gait: Gait is intact.      Deep Tendon Reflexes: Strength normal.   Psychiatric:         Speech: Speech normal.          Neurologic Exam     Mental Status   Oriented to person, place, and time.   Speech: speech is " normal   Level of consciousness: alert  Knowledge: good and consistent with education.   Normal comprehension.     Cranial Nerves   Cranial nerves II through XII intact.     CN II   Visual fields full to confrontation.   Visual acuity: normal  Right visual field deficit: none  Left visual field deficit: none     CN III, IV, VI   Pupils are equal, round, and reactive to light.  Extraocular motions are normal.   Nystagmus: none   Diplopia: none  Ophthalmoparesis: none  Upgaze: normal  Downgaze: normal  Conjugate gaze: present    CN V   Facial sensation intact.   Right corneal reflex: normal  Left corneal reflex: normal    CN VII   Right facial weakness: none  Left facial weakness: none    CN VIII   Hearing: intact    CN IX, X   Palate: symmetric  Right gag reflex: normal  Left gag reflex: normal    CN XI   Right sternocleidomastoid strength: normal  Left sternocleidomastoid strength: normal    CN XII   Tongue: not atrophic  Fasciculations: absent  Tongue deviation: none    Motor Exam   Muscle bulk: normal  Overall muscle tone: normal    Strength   Strength 5/5 throughout.     Sensory Exam   Light touch normal.     Gait, Coordination, and Reflexes     Gait  Gait: normal    Tremor   Resting tremor: absent  Intention tremor: present  Action tremor: right arm and left arm    Reflexes   Reflexes 2+ except as noted. Increased dyskinesia       Result Review :   The following data was reviewed by: Claus Louie MD on 11/18/2021:  Common labs    Common Labsle 9/1/21 9/1/21 9/29/21    1227 1227    Glucose  95    BUN  13    Creatinine  0.83    eGFR Non African Am  97    Sodium  138    Potassium  4.1    Chloride  102    Calcium  9.3    Albumin  4.60    Total Bilirubin  0.5    Alkaline Phosphatase  86    AST (SGOT)  15    ALT (SGPT)  <5    WBC 6.00     Hemoglobin 16.1     Hematocrit 49.0     Platelets 248     Hemoglobin A1C   4.80           Data reviewed: Radiologic studies MRI brain  and EEG          Assessment and Plan     Diagnoses and all orders for this visit:    1. Parkinson's disease (HCC) (Primary)  Assessment & Plan:  Stop trihexyphenidyl     Decrease Mirapex 0.75 mg TID     Sinemet continues       2. Moderate episode of recurrent major depressive disorder (HCC)  Assessment & Plan:  Patient's depression is recurrent and is mild without psychosis. Their depression is currently in partial remission and the condition is unchanged. This will be reassessed at the next regular appointment. F/U as described:patient will continue current medication therapy.        Follow Up   Return in about 2 weeks (around 12/2/2021).  Patient was given instructions and counseling regarding his condition or for health maintenance advice. Please see specific information pulled into the AVS if appropriate.

## 2021-12-02 ENCOUNTER — OFFICE VISIT (OUTPATIENT)
Dept: NEUROLOGY | Facility: CLINIC | Age: 51
End: 2021-12-02

## 2021-12-02 VITALS
DIASTOLIC BLOOD PRESSURE: 88 MMHG | HEIGHT: 72 IN | BODY MASS INDEX: 25.6 KG/M2 | OXYGEN SATURATION: 99 % | WEIGHT: 189 LBS | TEMPERATURE: 99.3 F | SYSTOLIC BLOOD PRESSURE: 154 MMHG | HEART RATE: 89 BPM

## 2021-12-02 DIAGNOSIS — G20 PARKINSON'S DISEASE (HCC): Primary | ICD-10-CM

## 2021-12-02 DIAGNOSIS — F33.1 MODERATE EPISODE OF RECURRENT MAJOR DEPRESSIVE DISORDER (HCC): Chronic | ICD-10-CM

## 2021-12-02 DIAGNOSIS — F95.9 TIC DISORDER: ICD-10-CM

## 2021-12-02 PROCEDURE — 99214 OFFICE O/P EST MOD 30 MIN: CPT | Performed by: NURSE PRACTITIONER

## 2021-12-02 NOTE — PROGRESS NOTES
Neuro Office Visit      Encounter Date: 2021   Patient Name: Jose Luis Mandujano  : 1970   MRN: [id number]     Chief Complaint:    Chief Complaint   Patient presents with   • Parkinson's Disease     2 week follow up        History of Present Illness: Jose Luis Mandujaon is a 51 y.o. male who is here today in Neurology with his girlfriend for PD, MDD    Last visit 21 w Dr Louie-stop trihexyphenidyl, decrease mirapex 0.75 TID, cont Sinemet      PD   Sinemet 25/100 2 tab 4 am, 2 tab 1:30 pm;  1 tab 8 pm  Mirapex 0.75 TID    Decreased his dose of sinemet and mirapex for symptoms of sudden sleep. He feels like is tremor is slightly worse. Tics of shoulder rolling, sucking in air and clearing his throat is better. Only evident when he is nervous.  Now complains of left hand posturing with fingers adducted and extended, hand flexed at MCP, thumb adducted. Holds arm close to body.    MRIMRI Brain With & Without Contrast (10/26/2021 09:39)-nml  EEG-nml    Problem History    Previously followed by Dr Baker and last seen by Dr Baker on 16 renewed propranolol.  Initial eval with left hand tremor for several months starting in late .  Tremor has now spread to left leg and developing left sided limp. Slowing movements of left arm and leg.       Dr Kit Kunz on 16   Action tremor in left hand with onset 4 years ago.  Noticed at first with smoking a cigarette.   Started on primidone on 16.  Stopped due to CNS side effects.         MDD  Taking Zoloft. Feels more anxious. He feels he cannot return to work.     Subjective      Past Medical History:   Past Medical History:   Diagnosis Date   • Moderate episode of recurrent major depressive disorder (HCC) 2017    Formatting of this note might be different from the original. Last Assessment & Plan:  Psychological condition is improving with treatment. Continue current treatment regimen. Psychological condition  will be reassessed at the next  regular appointment.   • Parkinson's disease (HCC) 12/15/2016    Formatting of this note might be different from the original. Last Assessment & Plan:  Sx stable on Sinemet, Artane, Mirapex       Past Surgical History: History reviewed. No pertinent surgical history.    Family History:   Family History   Problem Relation Age of Onset   • Heart disease Father         cardiac disorder   • Diabetes Father    • Hypertension Father    • Alcohol abuse Brother    • Mental illness Other    • Stroke Other        Social History:   Social History     Socioeconomic History   • Marital status:    Tobacco Use   • Smoking status: Former Smoker   • Smokeless tobacco: Never Used   Vaping Use   • Vaping Use: Never used   Substance and Sexual Activity   • Alcohol use: No   • Drug use: No   • Sexual activity: Defer       Medications:     Current Outpatient Medications:   •  carbidopa-levodopa (SINEMET)  MG per tablet, 2 tab in am 2 at 2:30pm 2 at 8pm, Disp: 150 tablet, Rfl: 2  •  pramipexole (MIRAPEX) 1.5 MG tablet, Take 1 tablet by mouth 3 (Three) Times a Day. (Patient taking differently: Take 1.5 mg by mouth 3 (Three) Times a Day. Taking half TID), Disp: 90 tablet, Rfl: 2  •  sertraline (ZOLOFT) 100 MG tablet, Take 1 tablet by mouth Daily., Disp: 90 tablet, Rfl: 3    Allergies:   No Known Allergies    PHQ-9 Total Score:     STEADI Fall Risk Assessment has not been completed.    Objective     Physical Exam:   Physical Exam  Eyes:      Pupils: Pupils are equal, round, and reactive to light.   Neurological:      Mental Status: He is oriented to person, place, and time.      Coordination: Finger-Nose-Finger Test abnormal. Heel to Shin Test and Romberg Test normal.      Gait: Gait is intact.      Deep Tendon Reflexes:      Reflex Scores:       Tricep reflexes are 2+ on the right side and 2+ on the left side.       Bicep reflexes are 2+ on the right side and 2+ on the left side.       Brachioradialis reflexes are 2+ on the  right side and 2+ on the left side.       Patellar reflexes are 2+ on the right side and 2+ on the left side.       Achilles reflexes are 2+ on the right side and 2+ on the left side.  Psychiatric:         Speech: Speech normal.         Neurologic Exam     Mental Status   Oriented to person, place, and time.   Follows 3 step commands.   Attention: normal. Concentration: normal.   Speech: speech is normal   Level of consciousness: alert  Knowledge: consistent with education.   Normal comprehension.     Cranial Nerves     CN III, IV, VI   Pupils are equal, round, and reactive to light.  Right pupil: Accommodation: intact.   Left pupil: Accommodation: intact.   CN III: no CN III palsy  CN VI: no CN VI palsy  Nystagmus: none   Diplopia: none  Upgaze: normal  Downgaze: normal  Conjugate gaze: present    CN VII   Facial expression full, symmetric.     CN VIII   Hearing: intact    CN XII   CN XII normal.     Motor Exam   Muscle bulk: normal  Overall muscle tone: normal    Strength   Right biceps: 5/5  Left biceps: 5/5  Right triceps: 5/5  Left triceps: 5/5  Right interossei: 5/5  Left interossei: 5/5  Right quadriceps: 5/5  Left quadriceps: 5/5  Right anterior tibial: 5/5  Left anterior tibial: 5/5  Right posterior tibial: 5/5  Left posterior tibial: 5/5    Sensory Exam   Light touch normal.     Gait, Coordination, and Reflexes     Gait  Gait: normal    Coordination   Romberg: negative  Finger to nose coordination: abnormal  Heel to shin coordination: normal    Tremor   Resting tremor: present  Action tremor: absent    Reflexes   Right brachioradialis: 2+  Left brachioradialis: 2+  Right biceps: 2+  Left biceps: 2+  Right triceps: 2+  Left triceps: 2+  Right patellar: 2+  Left patellar: 2+  Right achilles: 2+  Left achilles: 2+  Right : 2+  Left : 2+Left hand fingers extended and adducted, hand flexed at MCP with thumb adducted and arm held close to body. Able to fully extend hand.  Overall very kinetic, unable to  "stay still.        Vital Signs:   Vitals:    12/02/21 1334   BP: 154/88   Pulse: 89   Temp: 99.3 °F (37.4 °C)   SpO2: 99%   Weight: 85.7 kg (189 lb)   Height: 182.9 cm (72\")     Body mass index is 25.63 kg/m².     Results:   Imaging:   MRI Brain With & Without Contrast    Result Date: 10/26/2021  Essentially normal contrast-enhanced MRI of the brain. There is no evidence of ischemia, hemorrhage, mass, mass effect or abnormal enhancement.   This report was finalized on 10/26/2021 11:38 AM by Major Maldonado.      EEG Awake or Asleep Routine    Result Date: 11/11/2021  Normal study This report is transcribed using the Dragon dictation system.             Assessment / Plan      Assessment/Plan:   Diagnoses and all orders for this visit:    1. Parkinson's disease (HCC) (Primary)  -     Ambulatory Referral to Neurosurgery    2. Tic disorder    3. Moderate episode of recurrent major depressive disorder (HCC)    Other orders  -     Cancel: CBC Auto Differential; Future    Cont current meds.   Sinemet 25/100 2 tab 4 am, 2 tab 1:30 pm;  1 tab 8 pm  Mirapex 0.75 TID    Discussed deep brain stimulator. He is willing to discuss with neurosurgeon.    Patient Education:     Reviewed medications, potential side effects and signs and symptoms to report. Discussed risk versus benefits of treatment plan with patient and/or family-including medications, labs and radiology that may be ordered. Addressed questions and concerns during visit. Patient and/or family verbalized understanding and agree with plan. Instructed to call the office with any questions and report to ER with any life-threatening symptoms.     Follow Up:    3 months.  During this visit the following were done:  Labs Reviewed []    Labs Ordered []    Radiology Reports Reviewed []    Radiology Ordered []    PCP Records Reviewed []    Referring Provider Records Reviewed []    ER Records Reviewed []    Hospital Records Reviewed []    History Obtained From Family [x]  "   Radiology Images Reviewed []    Other Reviewed [x]    Records Requested []      Idalmis Ugarte, RC, APRN

## 2022-01-13 ENCOUNTER — TELEPHONE (OUTPATIENT)
Dept: NEUROLOGY | Facility: CLINIC | Age: 52
End: 2022-01-13

## 2022-01-13 NOTE — TELEPHONE ENCOUNTER
Caller: Jose Luis Mandujano    Relationship: Self    Best call back number: [phone]    What is the best time to reach you: ANY    Who are you requesting to speak with (clinical staff, provider,  specific staff member): CLINICAL    What was the call regarding: PT IS CALLING TO ASK IF TOBI WANTS TO SEE HIM AGAIN, AND IF HE IS TO CONTINUE GETTING HIS SHORT TERM DISABILITY PAPERWORK COMPLETED BY HER, OR IF IT IS TO BE TRANSFERRED TO DR. AVILA.  PT SAW DR. AVILA @ [facility] AND IT WAS DETERMINED THAT PT DOES NOT NEED A DEEP BRAIN STIMULATOR AT THIS TIME, BUT THAT HE MAY NEED ONE IN THE FUTURE.  HOWEVER, DR. AVILA HAS SET UP SOME FURTHER TESTING FOR PT, INCLUDING TESTING WHICH  OBSERVES PT TAKING HIS MEDICATION AND MONITORING ITS EFFECTS.      Do you require a callback: YES

## 2022-01-14 NOTE — TELEPHONE ENCOUNTER
"Called x1. LVM letting patient know \"We would like to continue his care in coordination with Dr Carcamo. We can complete his paperwork if Dr Carcamo declines to do it.\" Advised him to call us back in Dr. Carcamo's office will not complete paperwork.   "

## 2022-01-31 ENCOUNTER — TELEPHONE (OUTPATIENT)
Dept: NEUROLOGY | Facility: CLINIC | Age: 52
End: 2022-01-31

## 2022-02-01 NOTE — TELEPHONE ENCOUNTER
Called 1x. No answer. I LVM offering follow up appointment with Dr. Louie to discuss, alfa Castillo. I provided my name and our office call back number. Anyone can schedule appointment, if the patient is agreeable.   -TMT

## 2022-02-07 ENCOUNTER — OFFICE VISIT (OUTPATIENT)
Dept: NEUROLOGY | Facility: CLINIC | Age: 52
End: 2022-02-07

## 2022-02-07 VITALS
HEART RATE: 74 BPM | WEIGHT: 199 LBS | TEMPERATURE: 96.8 F | OXYGEN SATURATION: 97 % | HEIGHT: 72 IN | BODY MASS INDEX: 26.95 KG/M2 | DIASTOLIC BLOOD PRESSURE: 82 MMHG | SYSTOLIC BLOOD PRESSURE: 126 MMHG

## 2022-02-07 DIAGNOSIS — G47.20 SLEEP PATTERN DISTURBANCE: ICD-10-CM

## 2022-02-07 DIAGNOSIS — F33.1 MODERATE EPISODE OF RECURRENT MAJOR DEPRESSIVE DISORDER: ICD-10-CM

## 2022-02-07 DIAGNOSIS — R41.82 ALTERED MENTAL STATUS, UNSPECIFIED ALTERED MENTAL STATUS TYPE: ICD-10-CM

## 2022-02-07 DIAGNOSIS — G20 PARKINSON'S DISEASE: Primary | ICD-10-CM

## 2022-02-07 PROCEDURE — 99214 OFFICE O/P EST MOD 30 MIN: CPT | Performed by: NURSE PRACTITIONER

## 2022-02-07 RX ORDER — TRIHEXYPHENIDYL HYDROCHLORIDE 2 MG/1
1 TABLET ORAL DAILY
COMMUNITY

## 2022-02-07 NOTE — PROGRESS NOTES
Neuro Office Visit      Encounter Date: 2022   Patient Name: Jose Luis Mandujano  : 1970   MRN: [id number]     Chief Complaint:    Chief Complaint   Patient presents with   • Parkinson's Disease       History of Present Illness: Jose Luis Mandujano is a 51 y.o. male who is here today in Neurology for PD.    Last visit 21 w me-refer to neurosurg for DBS, cont sinemtet 25/100 2 tab 4am 2 tab 1:30pm 1 tab 8 pm, Mirapex 0.75 tid, artane 2mg 1 tid    Interval History seen by  Neurosurgeryw Dr Carcamo-Pt declined surgery as he is pleased with his quality of life at this time.  UPDRS testing. To follow up in one year.    PD  Doing well on current medications. Sinemet 25/100 2 tid, artane 1mg daily, mirapex 0.75 tid. Still feels the urge to move but shoulder movement and throat clearing is much less. Walking well. No falls. Still lethargic after artane but able to sleep at home. To control his symptoms this seems to be the best combination of medications if he is allowed to sleep off the side effects. He is still not driving due to medication side effects.    Transitioning from short-term to long term disability through his work policy at [employer].        Problem History   Previously followed by Dr Baker and last seen by Dr Baker on 16 renewed propranolol.  Initial eval with left hand tremor for several months starting in late .  Tremor has now spread to left leg and developing left sided limp. Slowing movements of left arm and leg.       Dr Kit Kunz on 16   Action tremor in left hand with onset 4 years ago.  Noticed at first with smoking a cigarette.   Started on primidone on 16.  Stopped due to CNS side effects.         MDD  Taking Zoloft. Feels moods are controlled.   Subjective      Past Medical History:   Past Medical History:   Diagnosis Date   • Moderate episode of recurrent major depressive disorder (HCC) 2017    Formatting of this note might be different from the original.  Last Assessment & Plan:  Psychological condition is improving with treatment. Continue current treatment regimen. Psychological condition  will be reassessed at the next regular appointment.   • Parkinson's disease (HCC) 12/15/2016    Formatting of this note might be different from the original. Last Assessment & Plan:  Sx stable on Sinemet, Artane, Mirapex       Past Surgical History: History reviewed. No pertinent surgical history.    Family History:   Family History   Problem Relation Age of Onset   • Heart disease Father         cardiac disorder   • Diabetes Father    • Hypertension Father    • Alcohol abuse Brother    • Mental illness Other    • Stroke Other        Social History:   Social History     Socioeconomic History   • Marital status:    Tobacco Use   • Smoking status: Former Smoker   • Smokeless tobacco: Never Used   Vaping Use   • Vaping Use: Never used   Substance and Sexual Activity   • Alcohol use: No   • Drug use: No   • Sexual activity: Defer       Medications:     Current Outpatient Medications:   •  carbidopa-levodopa (SINEMET)  MG per tablet, 2 tab in am 2 at 2:30pm 2 at 8pm, Disp: 150 tablet, Rfl: 2  •  pramipexole (MIRAPEX) 1.5 MG tablet, Take 1 tablet by mouth 3 (Three) Times a Day. (Patient taking differently: Take 1.5 mg by mouth 3 (Three) Times a Day. Taking half TID), Disp: 90 tablet, Rfl: 2  •  sertraline (ZOLOFT) 100 MG tablet, Take 1 tablet by mouth Daily. (Patient taking differently: Take 50 mg by mouth Daily.), Disp: 90 tablet, Rfl: 3  •  trihexyphenidyl (ARTANE) 2 MG tablet, Take 1 mg by mouth Daily., Disp: , Rfl:     Allergies:   No Known Allergies    PHQ-9 Total Score:     STEADI Fall Risk Assessment has not been completed.    Objective     Physical Exam:   Physical Exam  Eyes:      Pupils: Pupils are equal, round, and reactive to light.   Neurological:      Mental Status: He is oriented to person, place, and time.      Coordination: Finger-Nose-Finger Test  abnormal. Heel to Sapp Test normal.      Gait: Tandem walk abnormal.      Deep Tendon Reflexes:      Reflex Scores:       Tricep reflexes are 2+ on the right side and 2+ on the left side.       Bicep reflexes are 2+ on the right side and 2+ on the left side.       Brachioradialis reflexes are 2+ on the right side and 2+ on the left side.       Patellar reflexes are 2+ on the right side and 2+ on the left side.       Achilles reflexes are 2+ on the right side and 2+ on the left side.  Psychiatric:         Speech: Speech normal.         Neurologic Exam     Mental Status   Oriented to person, place, and time.   Follows 3 step commands.   Attention: normal. Concentration: normal.   Speech: speech is normal   Level of consciousness: alert  Knowledge: consistent with education.   Normal comprehension.     Cranial Nerves     CN III, IV, VI   Pupils are equal, round, and reactive to light.  Right pupil: Accommodation: intact.   Left pupil: Accommodation: intact.   CN III: no CN III palsy  CN VI: no CN VI palsy  Nystagmus: none   Diplopia: none  Upgaze: normal  Downgaze: normal  Conjugate gaze: present    CN VII   Facial expression full, symmetric.     CN VIII   Hearing: intact    CN XII   CN XII normal.   Slow blink     Motor Exam   Muscle bulk: normal  Overall muscle tone: normal    Strength   Right biceps: 5/5  Left biceps: 5/5  Right triceps: 5/5  Left triceps: 5/5  Right interossei: 5/5  Left interossei: 5/5  Right quadriceps: 5/5  Left quadriceps: 5/5  Right anterior tibial: 5/5  Left anterior tibial: 5/5  Right posterior tibial: 5/5  Left posterior tibial: 5/5    Sensory Exam   Light touch normal.     Gait, Coordination, and Reflexes     Coordination   Finger to nose coordination: abnormal  Heel to shin coordination: normal  Tandem walking coordination: abnormal    Tremor   Resting tremor: present  Action tremor: absent    Reflexes   Right brachioradialis: 2+  Left brachioradialis: 2+  Right biceps: 2+  Left biceps:  "2+  Right triceps: 2+  Left triceps: 2+  Right patellar: 2+  Left patellar: 2+  Right achilles: 2+  Left achilles: 2+  Right : 2+  Left : 2+Decreased left arm swing.        Vital Signs:   Vitals:    02/07/22 0816   BP: 126/82   Pulse: 74   Temp: 96.8 °F (36 °C)   SpO2: 97%   Weight: 90.3 kg (199 lb)   Height: 182.9 cm (72.01\")     Body mass index is 26.98 kg/m².     Results:   Imaging:   MRI Brain With & Without Contrast    Result Date: 10/26/2021  Essentially normal contrast-enhanced MRI of the brain. There is no evidence of ischemia, hemorrhage, mass, mass effect or abnormal enhancement.   This report was finalized on 10/26/2021 11:38 AM by Major Maldonado.      EEG Awake or Asleep Routine    Result Date: 11/11/2021  Normal study This report is transcribed using the Dragon dictation system.         Assessment / Plan      Assessment/Plan:   Diagnoses and all orders for this visit:    1. Parkinson's disease (HCC) (Primary)  Comments:  Cont Sinemet 25/100 2 tid, mirapex 0.75 tid, artaine 1 mg daily    2. Moderate episode of recurrent major depressive disorder (HCC)  Comments:  Cont sertraline    3. Sleep pattern disturbance    4. Altered mental status, unspecified altered mental status type  Comments:  Improved on Artane           Patient Education:       Reviewed medications, potential side effects and signs and symptoms to report. Discussed risk versus benefits of treatment plan with patient and/or family-including medications, labs and radiology that may be ordered. Addressed questions and concerns during visit. Patient and/or family verbalized understanding and agree with plan. Instructed to call the office with any questions and report to ER with any life-threatening symptoms.     Follow Up:   Return in about 6 months (around 8/7/2022) for Recheck.    During this visit the following were done:  Labs Reviewed []    Labs Ordered []    Radiology Reports Reviewed []    Radiology Ordered []    PCP Records " Reviewed []    Referring Provider Records Reviewed []    ER Records Reviewed []    Hospital Records Reviewed []    History Obtained From Family [x]    Radiology Images Reviewed []    Other Reviewed [x]    Records Requested []      Idalmis Ugarte, DNP, APRN

## 2022-07-22 DIAGNOSIS — G47.20 SLEEP PATTERN DISTURBANCE: ICD-10-CM

## 2022-07-22 DIAGNOSIS — G20 PARKINSON'S DISEASE: ICD-10-CM

## 2022-07-22 NOTE — TELEPHONE ENCOUNTER
Caller: Jose Luis Mandujano    Relationship: Self    Best call back number: [phone] (H)    Requested Prescriptions:   Requested Prescriptions     Pending Prescriptions Disp Refills   • carbidopa-levodopa (SINEMET)  MG per tablet 150 tablet 2     Si tab in am 2 at 2:30pm 2 at 8pm        Pharmacy where request [facility] be sent: [id number] [address] [phone] [facility] [phone] FX     Additional details provided by patient: PT IS CURRENTLY OUT OF MEDICATION - HE ALSO STATES HE IS TRANSFERRING PHARMACIES AND WANTED TO ASK  IF ALL MEDICATIONS CAN BE TRANSFERRED.     Does the patient have less than a 3 day supply:  [x] Yes  [] No    Seth Patel Rep   22 11:11 EDT

## 2022-07-22 NOTE — TELEPHONE ENCOUNTER
Rx Refill Note  Requested Prescriptions     Pending Prescriptions Disp Refills   • carbidopa-levodopa (SINEMET)  MG per tablet 150 tablet 2     Si tab in am 2 at 2:30pm 2 at 8pm      Last filled:  150 with 2 refills.  Last office visit with prescribing clinician: 2022      Next office visit with prescribing clinician: 2022     Sent 150 with 2 refills.    Mojgan Arshad MA  22, 11:21 EDT

## 2022-08-08 ENCOUNTER — TELEMEDICINE (OUTPATIENT)
Dept: NEUROLOGY | Facility: CLINIC | Age: 52
End: 2022-08-08

## 2022-08-08 DIAGNOSIS — G47.20 SLEEP PATTERN DISTURBANCE: ICD-10-CM

## 2022-08-08 DIAGNOSIS — G20 PARKINSON'S DISEASE: Primary | ICD-10-CM

## 2022-08-08 DIAGNOSIS — F33.1 MODERATE EPISODE OF RECURRENT MAJOR DEPRESSIVE DISORDER: ICD-10-CM

## 2022-08-08 PROCEDURE — 99214 OFFICE O/P EST MOD 30 MIN: CPT | Performed by: NURSE PRACTITIONER

## 2022-08-08 RX ORDER — SERTRALINE HYDROCHLORIDE 100 MG/1
50 TABLET, FILM COATED ORAL DAILY
Qty: 45 TABLET | Refills: 5 | Status: SHIPPED | OUTPATIENT
Start: 2022-08-08

## 2022-08-08 NOTE — PROGRESS NOTES
Neuro Office Visit      Encounter Date: 2022   Patient Name: Jose Luis Mandujano  : 1970   MRN: [id number]     Chief Complaint:    Chief Complaint   Patient presents with   • Parkinson's Disease     You have chosen to receive care through a telehealth visit.  Do you consent to use a video/audio connection for your medical care today? Yes      History of Present Illness: Jose Luis Mandujano is a 52 y.o. male who is here today in Neurology for Parkinson's Disease    Last visit 2022 w me-cont Sinemet  2 tabs TID, mirapex 0.75 tid, artaine 1 mg daily. Cont sertraline, artane.    Parkinson's Disease  Walking with a limp. No falls. LUE is stiff when meds wear off. No dysphagia or slurred speech.  Applying for disability. Has not worked since Sept. Using long term disability policy.    Previously referred to neurosurg for DBS.  Interval history seen by [facility] neurosurgery w Dr Carcamo. Pt declined surgery as he is pleased w his quality of life. Has follow up this fall.      Problem History   Previously followed by Dr Baker and last seen by Dr Baker on 16 renewed propranolol.  Initial eval with left hand tremor for several months starting in late .  Tremor has now spread to left leg and developing left sided limp. Slowing movements of left arm and leg.       Dr Kit Kunz on 16   Action tremor in left hand with onset 4 years ago.  Noticed at first with smoking a cigarette.   Started on primidone on 16.  Stopped due to CNS side effects.          MDD  Taking Zoloft. Feels moods are controlled.   Subjective      Past Medical History:   Past Medical History:   Diagnosis Date   • Moderate episode of recurrent major depressive disorder (HCC) 2017    Formatting of this note might be different from the original. Last Assessment & Plan:  Psychological condition is improving with treatment. Continue current treatment regimen. Psychological condition  will be reassessed at the next regular  appointment.   • Parkinson's disease (HCC) 12/15/2016    Formatting of this note might be different from the original. Last Assessment & Plan:  Sx stable on Sinemet, Artane, Mirapex       Past Surgical History: History reviewed. No pertinent surgical history.    Family History:   Family History   Problem Relation Age of Onset   • Heart disease Father         cardiac disorder   • Diabetes Father    • Hypertension Father    • Alcohol abuse Brother    • Mental illness Other    • Stroke Other        Social History:   Social History     Socioeconomic History   • Marital status:    Tobacco Use   • Smoking status: Former Smoker   • Smokeless tobacco: Never Used   Vaping Use   • Vaping Use: Never used   Substance and Sexual Activity   • Alcohol use: No   • Drug use: No   • Sexual activity: Defer       Medications:     Current Outpatient Medications:   •  carbidopa-levodopa (SINEMET)  MG per tablet, 2 tab in am 2 at 2:30pm 2 at 8pm, Disp: 180 tablet, Rfl: 2  •  sertraline (ZOLOFT) 100 MG tablet, Take 0.5 tablets by mouth Daily., Disp: 45 tablet, Rfl: 5  •  trihexyphenidyl (ARTANE) 2 MG tablet, Take 1 mg by mouth Daily., Disp: , Rfl:   •  pramipexole (MIRAPEX) 1.5 MG tablet, Take 0.5 tablets by mouth 3 (Three) Times a Day., Disp: 45 tablet, Rfl: 2    Allergies:   No Known Allergies    PHQ-9 Total Score:     STEADI Fall Risk Assessment has not been completed.    Objective     Physical Exam:   Physical Exam  Vitals and nursing note reviewed.   Constitutional:       General: He is not in acute distress.     Appearance: He is well-developed.   HENT:      Head: Normocephalic and atraumatic.      Right Ear: External ear normal.      Left Ear: External ear normal.      Nose: Nose normal.   Eyes:      General: No scleral icterus.        Right eye: No discharge.         Left eye: No discharge.      Conjunctiva/sclera: Conjunctivae normal.      Pupils: Pupils are equal, round, and reactive to light.   Cardiovascular:       Rate and Rhythm: Normal rate.   Pulmonary:      Effort: Pulmonary effort is normal.   Musculoskeletal:         General: No deformity.      Cervical back: Neck supple.   Skin:     General: Skin is warm and dry.      Findings: No rash.   Neurological:      General: No focal deficit present.      Mental Status: He is alert and oriented to person, place, and time. Mental status is at baseline.      Cranial Nerves: No cranial nerve deficit.      Sensory: No sensory deficit.      Motor: No weakness or abnormal muscle tone.      Coordination: Coordination normal.      Gait: Gait normal.      Deep Tendon Reflexes: Reflexes normal.   Psychiatric:         Mood and Affect: Mood normal.         Behavior: Behavior normal.         Thought Content: Thought content normal.         Judgment: Judgment normal.         Neurologic Exam     Mental Status   Oriented to person, place, and time.     Cranial Nerves     CN III, IV, VI   Pupils are equal, round, and reactive to light.       Vital Signs: There were no vitals filed for this visit.  There is no height or weight on file to calculate BMI.         Assessment / Plan      Assessment/Plan:   Diagnoses and all orders for this visit:    1. Parkinson's disease (HCC) (Primary)  Comments:  Cont Sinemet  2 tabs TID. Mirapex 1/2 tab tid and Artane 1 mg daily  Orders:  -     carbidopa-levodopa (SINEMET)  MG per tablet; 2 tab in am 2 at 2:30pm 2 at 8pm  Dispense: 180 tablet; Refill: 2  -     pramipexole (MIRAPEX) 1.5 MG tablet; Take 0.5 tablets by mouth 3 (Three) Times a Day.  Dispense: 45 tablet; Refill: 2    2. Sleep pattern disturbance  -     carbidopa-levodopa (SINEMET)  MG per tablet; 2 tab in am 2 at 2:30pm 2 at 8pm  Dispense: 180 tablet; Refill: 2    3. Moderate episode of recurrent major depressive disorder (HCC)  -     sertraline (ZOLOFT) 100 MG tablet; Take 0.5 tablets by mouth Daily.  Dispense: 45 tablet; Refill: 5         Patient Education:     Reviewed  medications, potential side effects and signs and symptoms to report. Discussed risk versus benefits of treatment plan with patient and/or family-including medications, labs and radiology that may be ordered. Addressed questions and concerns during visit. Patient and/or family verbalized understanding and agree with plan. Instructed to call the office with any questions and report to ER with any life-threatening symptoms.     Follow Up:   No follow-ups on file.    During this visit the following were done:  Labs Reviewed []    Labs Ordered []    Radiology Reports Reviewed []    Radiology Ordered []    PCP Records Reviewed []    Referring Provider Records Reviewed []    ER Records Reviewed []    Hospital Records Reviewed []    History Obtained From Family []    Radiology Images Reviewed []    Other Reviewed [x]    Records Requested []      Idalmis Ugarte, DNP, APRN

## 2022-08-09 RX ORDER — PRAMIPEXOLE DIHYDROCHLORIDE 1.5 MG/1
0.75 TABLET ORAL 3 TIMES DAILY
Qty: 45 TABLET | Refills: 2 | Status: SHIPPED | OUTPATIENT
Start: 2022-08-09 | End: 2023-01-06 | Stop reason: SDUPTHER

## 2023-01-06 ENCOUNTER — TELEPHONE (OUTPATIENT)
Dept: NEUROLOGY | Facility: CLINIC | Age: 53
End: 2023-01-06

## 2023-01-06 DIAGNOSIS — G47.20 SLEEP PATTERN DISTURBANCE: ICD-10-CM

## 2023-01-06 DIAGNOSIS — G20 PARKINSON'S DISEASE: ICD-10-CM

## 2023-01-06 RX ORDER — PRAMIPEXOLE DIHYDROCHLORIDE 1.5 MG/1
0.75 TABLET ORAL 3 TIMES DAILY
Qty: 45 TABLET | Refills: 2 | Status: SHIPPED | OUTPATIENT
Start: 2023-01-06 | End: 2024-01-06

## 2023-01-06 NOTE — TELEPHONE ENCOUNTER
Rx Refill Note  Requested Prescriptions     Pending Prescriptions Disp Refills   • carbidopa-levodopa (SINEMET)  MG per tablet 180 tablet 2     Si tab in am 2 at 2:30pm 2 at 8pm   • pramipexole (MIRAPEX) 1.5 MG tablet 45 tablet 2     Sig: Take 0.5 tablets by mouth 3 (Three) Times a Day.      Last filled: Carbidopa-Levodopa 2022 180 with 2 refills.  Pramipexole 2022 45 2 refills.  Last office visit with prescribing clinician: 2022      Next office visit with prescribing clinician: 2023     Sent in Carbidopa-Levodopa 180 with 2 refills.  Promipexole 45 with 2 refills.    Mojgan Arshad MA  23, 09:54 EST

## 2023-01-06 NOTE — TELEPHONE ENCOUNTER
Provider: PILO  Caller: JEREMY PARTIDA  Relationship to Patient: N/A  Phone Number: [phone]  Reason for Call: FRANCISCO MOORE/ SUSANA LITTLE TELEPHONED TO ADVISE THAT DR BROOKS WILL DO THE DISABILITY CLAIM REVIEW. IF DR DAIGLE WOULD LIKE TO SUBMIT MORE INFO., PLEASE CONTACT DR BROOKS @ [phone]    THANK YOU

## 2023-01-06 NOTE — TELEPHONE ENCOUNTER
Caller: Jose Luis Mandujano    Relationship: Self    Best call back number: [phone]    Requested Prescriptions:   Requested Prescriptions     Pending Prescriptions Disp Refills   • carbidopa-levodopa (SINEMET)  MG per tablet 180 tablet 2     Si tab in am 2 at 2:30pm 2 at 8pm   • pramipexole (MIRAPEX) 1.5 MG tablet 45 tablet 2     Sig: Take 0.5 tablets by mouth 3 (Three) Times a Day.        Pharmacy where request should be sent: [facility] #[id number] [address] [phone] [facility] [phone] FX     Additional details provided by patient: PATIENT HAS 2 DAYS WORTH OF CABIDOPA-LEVODOPA REMAINING. ALSO HE SAID THEY HAVE BEEN GIVING  PILLS BUT HE SHOULD BE GETTING 180 BECAUSE HE TAKES 6 PER DAY. THE PRAMIPEXOLE HE HAS AT LEAST A WEEK'S WORTH STILL.    Does the patient have less than a 3 day supply:  [x] Yes  [] No    Would you like a call back once the refill request has been completed: [x] Yes [] No    If the office needs to give you a call back, can they leave a voicemail: [x] Yes [] No    Seth Delong Rep   23 09:20 EST

## 2023-01-09 NOTE — TELEPHONE ENCOUNTER
FRANCISCO IS CALLING AGAIN RE: DISABILITY CLAIM.    SHE STATES DR TINAJERO WILL DO DISABILITY CLAIM REVIEW FOR [facility] FIN[employer]CIAL CHECKING TO SEE IF THERE IS ANY ADDTIONAL INFO RE: THIS CLAIM.    IF SO PLEASE REACH OUT [phone].     THANK YOU

## 2023-02-22 ENCOUNTER — OFFICE VISIT (OUTPATIENT)
Dept: NEUROLOGY | Facility: CLINIC | Age: 53
End: 2023-02-22
Payer: COMMERCIAL

## 2023-02-22 VITALS
SYSTOLIC BLOOD PRESSURE: 140 MMHG | HEIGHT: 73 IN | WEIGHT: 185.8 LBS | DIASTOLIC BLOOD PRESSURE: 74 MMHG | OXYGEN SATURATION: 99 % | TEMPERATURE: 97.1 F | HEART RATE: 81 BPM | BODY MASS INDEX: 24.63 KG/M2

## 2023-02-22 DIAGNOSIS — G20 PARKINSON'S DISEASE: Primary | ICD-10-CM

## 2023-02-22 DIAGNOSIS — F33.1 MODERATE EPISODE OF RECURRENT MAJOR DEPRESSIVE DISORDER: ICD-10-CM

## 2023-02-22 PROCEDURE — 99214 OFFICE O/P EST MOD 30 MIN: CPT | Performed by: NURSE PRACTITIONER

## 2023-02-22 RX ORDER — ENTACAPONE 200 MG/1
200 TABLET ORAL 3 TIMES DAILY
Qty: 90 TABLET | Refills: 5 | Status: SHIPPED | OUTPATIENT
Start: 2023-02-22

## 2023-02-22 NOTE — PROGRESS NOTES
Neuro Office Visit      Encounter Date: 2023   Patient Name: Jose Luis Mandujano  : 1970   MRN: [id number]   PCP: Dr Santana  Chief Complaint:    Chief Complaint   Patient presents with   • Parkinson's Disease       History of Present Illness: Jose Luis Mandujano is a 52 y.o. male who is here today in Neurology for Parkinson's Diseaes    Last visit 2022 w me telemedicine-Cont sinemet 25/100 2 tabs TID  Mirapex 1.5 1/2 tab TID, sertraline 100 1/2 tab qd    Interim History   Seen at  Neuro yesterday. Pt with progression of disease however medications are still effective. Having some wearing off 2 hours before next dose. Recommended addition of entacapone.      PD  Sinemet  2 tabs TID, mirapex 0.75 tid, artaine 1 mg daily.   Meds wearing off 2 hours before next dose.   No falls. LUE and ankles are stiff when meds wear off. No dysphagia or slurred speech. Having disturbing dreams but no hallucinations.  Tremor is controlled well with meds. Without meds has difficulty standing, walking w severe LUE and LLE tremor. Possible mild tremor in right hand.    Previously referred to neurosurg for DBS.  Interval history seen by  neurosurgery w Dr Carcamo. Pt declined surgery as he is pleased w his quality of life.       Problem History  Previously followed by Dr Baker and last seen by Dr aBker on 16 renewed propranolol.  Initial eval with left hand tremor for several months starting in late .  Tremor has now spread to left leg and developing left sided limp. Slowing movements of left arm and leg.       Dr Kit Kunz on 16   Action tremor in left hand with onset 4 years ago.  Noticed at first with smoking a cigarette.   Started on primidone on 16.  Stopped due to CNS side effects      MDD  Taking Sertraline. Moods stable. On long-term disability from [employer].    Subjective      Past Medical History:   Past Medical History:   Diagnosis Date   • Moderate episode of recurrent major depressive  disorder (HCC) 1/6/2017    Formatting of this note might be different from the original. Last Assessment & Plan:  Psychological condition is improving with treatment. Continue current treatment regimen. Psychological condition  will be reassessed at the next regular appointment.   • Parkinson's disease ([facility]) 12/15/2016    Formatting of this note might be different from the original. Last Assessment & Plan:  Sx stable on Sinemet, Artane, Mirapex       Past Surgical History: History reviewed. No pertinent surgical history.    Family History:   Family History   Problem Relation Age of Onset   • Heart disease Father         cardiac disorder   • Diabetes Father    • Hypertension Father    • Alcohol abuse Brother    • Mental illness Other    • Stroke Other        Social History:   Social History     Socioeconomic History   • Marital status:    Tobacco Use   • Smoking status: Former   • Smokeless tobacco: Never   Vaping Use   • Vaping Use: Never used   Substance and Sexual Activity   • Alcohol use: No   • Drug use: No   • Sexual activity: Defer       Medications:     Current Outpatient Medications:   •  carbidopa-levodopa (SINEMET)  MG per tablet, 2 tab in am 2 at 2:30pm 2 at 8pm, Disp: 180 tablet, Rfl: 2  •  pramipexole (MIRAPEX) 1.5 MG tablet, Take 0.5 tablets by mouth 3 (Three) Times a Day., Disp: 45 tablet, Rfl: 2  •  sertraline (ZOLOFT) 100 MG tablet, Take 0.5 tablets by mouth Daily., Disp: 45 tablet, Rfl: 5  •  trihexyphenidyl (ARTANE) 2 MG tablet, Take 1 mg by mouth Daily., Disp: , Rfl:   •  entacapone (COMTAN) 200 MG tablet, Take 1 tablet by mouth 3 (Three) Times a Day., Disp: 90 tablet, Rfl: 5    Allergies:   No Known Allergies    PHQ-9 Total Score:     STEADI Fall Risk Assessment has not been completed.    Objective     Physical Exam:   Physical Exam  Eyes:      Pupils: Pupils are equal, round, and reactive to light.   Neurological:      Mental Status: He is oriented to person, place, and time.       Coordination: Finger-Nose-Finger Test and Heel to Shin Test normal.      Gait: Gait is intact.      Deep Tendon Reflexes:      Reflex Scores:       Tricep reflexes are 2+ on the right side and 2+ on the left side.       Bicep reflexes are 2+ on the right side and 2+ on the left side.       Brachioradialis reflexes are 2+ on the right side and 2+ on the left side.       Patellar reflexes are 2+ on the right side and 2+ on the left side.       Achilles reflexes are 2+ on the right side and 2+ on the left side.  Psychiatric:         Speech: Speech normal.         Neurologic Exam     Mental Status   Oriented to person, place, and time.   Follows 3 step commands.   Attention: normal. Concentration: normal.   Speech: speech is normal   Level of consciousness: alert  Knowledge: consistent with education.   Normal comprehension.     Cranial Nerves     CN III, IV, VI   Pupils are equal, round, and reactive to light.  Right pupil: Accommodation: intact.   Left pupil: Accommodation: intact.   CN III: no CN III palsy  CN VI: no CN VI palsy  Nystagmus: none   Diplopia: none  Upgaze: normal  Downgaze: normal  Conjugate gaze: present    CN VII   Facial expression full, symmetric.     CN VIII   Hearing: intact    CN XII   CN XII normal.     Motor Exam   Muscle bulk: normal  Overall muscle tone: normal    Strength   Right biceps: 5/5  Left biceps: 5/5  Right triceps: 5/5  Left triceps: 5/5  Right interossei: 5/5  Left interossei: 5/5  Right quadriceps: 5/5  Left quadriceps: 5/5  Right anterior tibial: 5/5  Left anterior tibial: 5/5  Right posterior tibial: 5/5  Left posterior tibial: 5/5    Sensory Exam   Light touch normal.     Gait, Coordination, and Reflexes     Gait  Gait: normal    Coordination   Finger to nose coordination: normal  Heel to shin coordination: normal    Tremor   Resting tremor: absent  Action tremor: left arm    Reflexes   Right brachioradialis: 2+  Left brachioradialis: 2+  Right biceps: 2+  Left biceps:  "2+  Right triceps: 2+  Left triceps: 2+  Right patellar: 2+  Left patellar: 2+  Right achilles: 2+  Left achilles: 2+  Right : 2+  Left : 2+Mild left hand tremor at rest.        Vital Signs:   Vitals:    02/22/23 1429   BP: 140/74   Pulse: 81   Temp: 97.1 °F (36.2 °C)   SpO2: 99%   Weight: 84.3 kg (185 lb 12.8 oz)   Height: 185.4 cm (73\")     Body mass index is 24.51 kg/m².         Assessment / Plan      Assessment/Plan:   Diagnoses and all orders for this visit:    1. Parkinson's disease (HCC) (Primary)  Comments:  Cont Sinemet 25/100 2 tabs TID. Add entcaptone 200 tid. Cont mirapesx and artane.  If not improvement consider adding 4th dose.  Orders:  -     entacapone (COMTAN) 200 MG tablet; Take 1 tablet by mouth 3 (Three) Times a Day.  Dispense: 90 tablet; Refill: 5    2. Moderate episode of recurrent major depressive disorder (HCC)  Comments:  Cont sertraline           Patient Education:     Reviewed medications, potential side effects and signs and symptoms to report. Discussed risk versus benefits of treatment plan with patient and/or family-including medications, labs and radiology that may be ordered. Addressed questions and concerns during visit. Patient and/or family verbalized understanding and agree with plan. Instructed to call the office with any questions and report to ER with any life-threatening symptoms.     Follow Up:   Return in about 6 months (around 8/22/2023).    During this visit the following were done:  Labs Reviewed []    Labs Ordered []    Radiology Reports Reviewed []    Radiology Ordered []    PCP Records Reviewed []    Referring Provider Records Reviewed [x]    ER Records Reviewed []    Hospital Records Reviewed []    History Obtained From Family []    Radiology Images Reviewed []    Other Reviewed [x]    Records Requested []      Idalmis Ugarte, RC, APRN  "

## 2023-04-05 DIAGNOSIS — G47.20 SLEEP PATTERN DISTURBANCE: ICD-10-CM

## 2023-04-05 DIAGNOSIS — G20 PARKINSON'S DISEASE: ICD-10-CM

## 2023-04-05 NOTE — TELEPHONE ENCOUNTER
Caller: KURT Lopez call back number: [phone]    Requested Prescriptions:  PRAMIPEXOLE 105   CARBID LEVOD 25.-100 MG   Requested Prescriptions      No prescriptions requested or ordered in this encounter        Pharmacy where request should be sent:[id number] [address] - [phone]  - [phone]   [phone]      Last office visit with prescribing clinician: 2/22/2023   Last telemedicine visit with prescribing clinician: 8/24/2023   Next office visit with prescribing clinician: 8/24/2023     Additional details provided by patient:     Does the patient have less than a 3 day supply:  [x] Yes  [] No    Would you like a call back once the refill request has been completed: [] Yes [] No    If the office needs to give you a call back, can they leave a voicemail: [] Yes [] No      PLEASE ADVISE     Seth Vásquez Rep   04/05/23 08:49 EDT

## 2023-04-05 NOTE — TELEPHONE ENCOUNTER
Rx Refill Note  Requested Prescriptions     Pending Prescriptions Disp Refills   • carbidopa-levodopa (SINEMET)  MG per tablet 180 tablet 2     Si tab in am 2 at 2:30pm 2 at 8pm      Last filled: 23 180 with 2 refills.  Last office visit with prescribing clinician: 2023      Next office visit with prescribing clinician: 2023     Sent in 180 with 5 refills.    Mojgan Arshad MA  23, 09:07 EDT

## 2023-08-25 ENCOUNTER — TELEPHONE (OUTPATIENT)
Dept: NEUROLOGY | Facility: CLINIC | Age: 53
End: 2023-08-25

## 2023-08-25 ENCOUNTER — OFFICE VISIT (OUTPATIENT)
Dept: NEUROLOGY | Facility: CLINIC | Age: 53
End: 2023-08-25
Payer: COMMERCIAL

## 2023-08-25 VITALS
DIASTOLIC BLOOD PRESSURE: 78 MMHG | HEART RATE: 86 BPM | SYSTOLIC BLOOD PRESSURE: 132 MMHG | WEIGHT: 193 LBS | BODY MASS INDEX: 25.58 KG/M2 | OXYGEN SATURATION: 96 % | HEIGHT: 73 IN

## 2023-08-25 DIAGNOSIS — F33.1 MODERATE EPISODE OF RECURRENT MAJOR DEPRESSIVE DISORDER: ICD-10-CM

## 2023-08-25 DIAGNOSIS — G20 PARKINSON'S DISEASE: Primary | ICD-10-CM

## 2023-08-25 PROCEDURE — 99214 OFFICE O/P EST MOD 30 MIN: CPT | Performed by: NURSE PRACTITIONER

## 2023-08-25 RX ORDER — CARBIDOPA AND LEVODOPA 50; 200 MG/1; MG/1
1 TABLET, EXTENDED RELEASE ORAL
Qty: 180 TABLET | Refills: 11 | Status: SHIPPED | OUTPATIENT
Start: 2023-08-25 | End: 2024-08-24

## 2023-08-25 NOTE — TELEPHONE ENCOUNTER
"Caller: Jose Luis Mandujano \"NOEL\"    Relationship: Self    Best call back number: [phone]    What was the call regarding: PT CALLED TO LET OFFICE KNOW THAT HE WILL BE SENDING OVER A DETAILED [employer] MESSAGE REGARDING THE SHORT-TERM DISABILITY PPW HE DROPPED OFF WHILE IN OFFICE TODAY.    Do you require a callback: IF NEEDED.    Is it okay if the provider responds through [employer]?: YES    PLEASE REVIEW AND ADVISE.  "

## 2023-08-25 NOTE — PROGRESS NOTES
Neuro Office Visit      Encounter Date: 2023   Patient Name: Jose Luis Mandujano  : 1970   MRN: [id number]   PCP:  Major Santana DO     Chief Complaint:    Chief Complaint   Patient presents with    Parkinson's Disease       History of Present Illness: Jose Luis Mandujano is a 53 y.o. male who is here today in Neurology for Parkinson's disease.    Last visit with JOSE L Anguiano on 2023 continued levodopa-levodopa and started entacapone.    Mr. Mandujano was unable to tolerate the entacapone secondary to side effects.    Carbidopa-levodopa is wearing off several hours before its time to take the next dose.  Currently he is taking 1 tablet 6 times a day medication wears off at least an hour or 2 for the next dose.    When he awakens in the morning he feels extremely stiff and tight.  Following his first dose of medicine he will start to feel himself loosen up within about 20 to 30 minutes.    He has been on FMLA with [employer] for 2 years.  He has been working on getting disability and had a hearing last month but was told that it could be up to 8 months before a ruling was made.    He received a letter from [employer] that he must return to work in some capacity before the end of August as medical leave of absence will .        Subjective      Past Medical History:   Past Medical History:   Diagnosis Date    Moderate episode of recurrent major depressive disorder 2017    Formatting of this note might be different from the original. Last Assessment & Plan:  Psychological condition is improving with treatment. Continue current treatment regimen. Psychological condition  will be reassessed at the next regular appointment.    Parkinson's disease 12/15/2016    Formatting of this note might be different from the original. Last Assessment & Plan:  Sx stable on Sinemet, Artane, Mirapex       Past Surgical History: History reviewed. No pertinent surgical history.    Family History:   Family History    Problem Relation Age of Onset    Heart disease Father         cardiac disorder    Diabetes Father     Hypertension Father     Alcohol abuse Brother     Mental illness Other     Stroke Other        Social History:   Social History     Socioeconomic History    Marital status:    Tobacco Use    Smoking status: Former    Smokeless tobacco: Never   Vaping Use    Vaping Use: Never used   Substance and Sexual Activity    Alcohol use: No    Drug use: No    Sexual activity: Defer       Medications:     Current Outpatient Medications:     pramipexole (MIRAPEX) 1.5 MG tablet, Take 0.5 tablets by mouth 3 (Three) Times a Day., Disp: 45 tablet, Rfl: 2    sertraline (ZOLOFT) 100 MG tablet, Take 0.5 tablets by mouth Daily., Disp: 45 tablet, Rfl: 5    trihexyphenidyl (ARTANE) 2 MG tablet, Take 0.5 tablets by mouth Daily., Disp: , Rfl:     carbidopa-levodopa CR (SINEMET CR)  MG per CR tablet, Take 1 tablet by mouth 6 (Six) Times a Day., Disp: 180 tablet, Rfl: 11    Allergies:   No Known Allergies    PHQ-9 Total Score:     STEADI Fall Risk Assessment has not been completed.    Objective     Physical Exam:   Physical Exam  Vitals reviewed.   Eyes:      Extraocular Movements: EOM normal.      Pupils: Pupils are equal, round, and reactive to light.   Neurological:      Mental Status: He is oriented to person, place, and time.      Gait: Gait is intact.   Psychiatric:         Speech: Speech normal.       Neurologic Exam     Mental Status   Oriented to person, place, and time.   Attention: normal. Concentration: normal.   Speech: speech is normal   Level of consciousness: alert  Knowledge: good.     Cranial Nerves     CN II   Visual fields full to confrontation.     CN III, IV, VI   Pupils are equal, round, and reactive to light.  Extraocular motions are normal.   CN III: no CN III palsy  CN VI: no CN VI palsy    CN V   Facial sensation intact.     CN VII   Facial expression full, symmetric.     CN VIII  "  CN VIII normal.     CN IX, X   CN IX normal.   CN X normal.     CN XII   CN XII normal.     Motor Exam     Strength   Right neck flexion: 5/5  Left neck flexion: 5/5  Right neck extension: 5/5  Left neck extension: 5/5  Right deltoid: 5/5  Left deltoid: 5/5  Right biceps: 5/5  Left biceps: 5/5  Right triceps: 5/5  Left triceps: 5/5  Right wrist flexion: 5/5  Left wrist flexion: 5/5  Right wrist extension: 5/5  Left wrist extension: 5/5  Right interossei: 5/5  Left interossei: 5/5  Right abdominals: 5/5  Left abdominals: 5/5  Right iliopsoas: 5/5  Left iliopsoas: 5/5  Right quadriceps: 5/5  Left quadriceps: 5/5  Right hamstrin/5  Left hamstrin/5  Right glutei: 5/5  Left glutei: 5/5  Right anterior tibial: 5/5  Left anterior tibial: 5/5  Right posterior tibial: 5/5  Left posterior tibial: 5/5  Right peroneal: 5/5  Left peroneal: 5/5  Right gastroc: 5/5  Left gastroc: 5/5    Sensory Exam   Light touch normal.     Gait, Coordination, and Reflexes     Gait  Gait: normal    Tremor   Resting tremor: present  Intention tremor: present  Action tremor: absent     Vital Signs:   Vitals:    23 1058   BP: 132/78   Pulse: 86   SpO2: 96%   Weight: 87.5 kg (193 lb)   Height: 185.4 cm (73\")     Body mass index is 25.46 kg/mý.     Results:   Imaging:   No Images in the past 120 days found..     Labs:    No results found for: CMP, PROTEIN, ANTIMOGAB, CCEBEF1CJAT, JCVRESULT, QUANTTBGOLD, CBCDIF, IGGALBSER     Assessment / Plan      Assessment/Plan:   Diagnoses and all orders for this visit:    1. Parkinson's disease (Primary)  Comments:  Increase carbidopa-levodopa    2. Moderate episode of recurrent major depressive disorder  Comments:  Continue Zoloft    Other orders  -     carbidopa-levodopa CR (SINEMET CR)  MG per CR tablet; Take 1 tablet by mouth 6 (Six) Times a Day.  Dispense: 180 tablet; Refill: 11           Patient Education:   Increase carbidopa levodopa dose to see if this will help with wearing " off.  Reviewed medications, potential side effects and signs and symptoms to report. Discussed risk versus benefits of treatment plan with patient and/or family-including medications, labs and radiology that may be ordered. Addressed questions and concerns during visit. Patient and/or family verbalized understanding and agree with plan. Instructed to call the office with any questions and report to ER with any life-threatening symptoms.     Follow Up:   No follow-ups on file.    I spent 30  minutes face to face with the patient. I personally spent 50 percent of this time counseling and discussing diagnosis, diagnostic testing, treatment options, and management .       During this visit the following were done:  Labs Reviewed []    Labs Ordered []    Radiology Reports Reviewed []    Radiology Ordered []    PCP Records Reviewed []    Referring Provider Records Reviewed []    ER Records Reviewed []    Hospital Records Reviewed []    History Obtained From Family []    Radiology Images Reviewed []    Other Reviewed []    Records Requested []      JOSE L Holcomb  [facility]  [address]  [facility] [address] [phone]

## 2023-08-29 ENCOUNTER — DOCUMENTATION (OUTPATIENT)
Dept: NEUROLOGY | Facility: CLINIC | Age: 53
End: 2023-08-29
Payer: COMMERCIAL

## 2023-09-01 ENCOUNTER — TELEPHONE (OUTPATIENT)
Dept: NEUROLOGY | Facility: CLINIC | Age: 53
End: 2023-09-01

## 2023-09-01 NOTE — TELEPHONE ENCOUNTER
Dr. Vila called wanting to speak with Dr. Louie, was hoping for a quick update on this patient.     [phone] personal phone

## 2023-09-05 ENCOUNTER — TELEPHONE (OUTPATIENT)
Dept: NEUROLOGY | Facility: CLINIC | Age: 53
End: 2023-09-05
Payer: COMMERCIAL

## 2023-09-07 NOTE — TELEPHONE ENCOUNTER
Paperwork received and completed by Dr oLuie instead of the call.  Faxed paperwork back on 09/07/2023.

## 2023-10-19 DIAGNOSIS — G20.A1 PARKINSON'S DISEASE: ICD-10-CM

## 2023-10-19 RX ORDER — PRAMIPEXOLE DIHYDROCHLORIDE 1.5 MG/1
0.75 TABLET ORAL 3 TIMES DAILY
Qty: 45 TABLET | Refills: 2 | Status: SHIPPED | OUTPATIENT
Start: 2023-10-19 | End: 2024-10-18

## 2023-10-19 NOTE — TELEPHONE ENCOUNTER
Rx Refill Note  Requested Prescriptions     Pending Prescriptions Disp Refills    pramipexole (MIRAPEX) 1.5 MG tablet 45 tablet 2     Sig: Take 0.5 tablets by mouth 3 (Three) Times a Day.      Last filled: 7/3/23 with 2 RF sent in  Last office visit with prescribing clinician: 8/25/23    Next office visit with prescribing clinician: 12/4/23    Barbi Mendez MA  10/19/23, 13:33 EDT

## 2023-10-19 NOTE — TELEPHONE ENCOUNTER
"    Caller: Jose Luis Mandujano \"NOEL\"    Relationship: Self    Best call back number: [phone]    Requested Prescriptions:   Requested Prescriptions     Pending Prescriptions Disp Refills    pramipexole (MIRAPEX) 1.5 MG tablet 45 tablet 2     Sig: Take 0.5 tablets by mouth 3 (Three) Times a Day.        Pharmacy where request should be sent: [id number] [address]  AT [facility] [phone] [facility] [phone] FX     Last office visit with prescribing clinician: 11/18/2021   Last telemedicine visit with prescribing clinician: Visit date not found   Next office visit with prescribing clinician: Visit date not found     Does the patient have less than a 3 day supply:  [x] Yes  [] No    Would you like a call back once the refill request has been completed: [] Yes [x] No    If the office needs to give you a call back, can they leave a voicemail: [] Yes [x] No    Seth Mendoza Rep   10/19/23 13:22 EDT           " Normal rate, regular rhythm.  Heart sounds S1, S2.  No murmurs, rubs or gallops.

## 2023-12-04 ENCOUNTER — OFFICE VISIT (OUTPATIENT)
Dept: NEUROLOGY | Facility: CLINIC | Age: 53
End: 2023-12-04
Payer: COMMERCIAL

## 2023-12-04 VITALS
SYSTOLIC BLOOD PRESSURE: 126 MMHG | HEIGHT: 73 IN | DIASTOLIC BLOOD PRESSURE: 80 MMHG | WEIGHT: 186.6 LBS | HEART RATE: 100 BPM | OXYGEN SATURATION: 99 % | BODY MASS INDEX: 24.73 KG/M2

## 2023-12-04 DIAGNOSIS — G20.A1 PARKINSON'S DISEASE: ICD-10-CM

## 2023-12-04 PROCEDURE — 99214 OFFICE O/P EST MOD 30 MIN: CPT | Performed by: NURSE PRACTITIONER

## 2023-12-04 RX ORDER — CARBIDOPA AND LEVODOPA 50; 200 MG/1; MG/1
1 TABLET, EXTENDED RELEASE ORAL 4 TIMES DAILY
Qty: 360 TABLET | Refills: 3 | Status: SHIPPED | OUTPATIENT
Start: 2023-12-04 | End: 2024-12-03

## 2023-12-04 RX ORDER — PRAMIPEXOLE DIHYDROCHLORIDE 1.5 MG/1
0.75 TABLET ORAL 3 TIMES DAILY
Qty: 135 TABLET | Refills: 3 | Status: SHIPPED | OUTPATIENT
Start: 2023-12-04 | End: 2024-12-03

## 2023-12-04 NOTE — PROGRESS NOTES
Neuro Office Visit      Encounter Date: 2023   Patient Name: Jose Luis Mandujano  : 1970   MRN: [id number]   PCP:  Major Santana DO     Chief Complaint:    Chief Complaint   Patient presents with    Parkinson's Disease       History of Present Illness: Jose Luis Mandujano is a 53 y.o. male who is here today in Neurology for Parkinson's Disease.    Last visit with me on 2023 carbidopa-levodopa increased.     Decreased dose of carbidopa-levodopa from 6 times a day to 4 times a day and that is working well for him.    Feels very well and is able to do most things that he wants to do, has been able to go hunting without issues.    Has no difficulty climbing hills but does note that his legs feel a little bit weaker when he is walking down a hill.    Problem History:  Previously followed by Dr Baker and last seen by Dr Baker on 16 renewed propranolol.  Initial eval with left hand tremor for several months starting in late . Tremor has now spread to left leg and developing left sided limp. Slowing movements of left arm and leg.       Dr Kit Kunz on 16: Action tremor in left hand with onset 4 years ago.  Noticed at first with smoking a cigarette.   Started on primidone on 16.  Stopped due to CNS side effects        MDD  Taking Sertraline. Moods stable. On long-term disability from [employer].      Subjective      Past Medical History:   Past Medical History:   Diagnosis Date    Moderate episode of recurrent major depressive disorder 2017    Formatting of this note might be different from the original. Last Assessment & Plan:  Psychological condition is improving with treatment. Continue current treatment regimen. Psychological condition  will be reassessed at the next regular appointment.    Parkinson's disease 12/15/2016    Formatting of this note might be different from the original. Last Assessment & Plan:  Sx stable on Sinemet, Artane, Mirapex       Past Surgical History:  History reviewed. No pertinent surgical history.    Family History:   Family History   Problem Relation Age of Onset    Heart disease Father         cardiac disorder    Diabetes Father     Hypertension Father     Alcohol abuse Brother     Mental illness Other     Stroke Other        Social History:   Social History     Socioeconomic History    Marital status:    Tobacco Use    Smoking status: Former    Smokeless tobacco: Never   Vaping Use    Vaping Use: Never used   Substance and Sexual Activity    Alcohol use: No    Drug use: No    Sexual activity: Defer       Medications:     Current Outpatient Medications:     carbidopa-levodopa CR (SINEMET CR)  MG per CR tablet, Take 1 tablet by mouth 4 (Four) Times a Day., Disp: 360 tablet, Rfl: 3    pramipexole (MIRAPEX) 1.5 MG tablet, Take 0.5 tablets by mouth 3 (Three) Times a Day., Disp: 135 tablet, Rfl: 3    sertraline (ZOLOFT) 100 MG tablet, Take 0.5 tablets by mouth Daily., Disp: 45 tablet, Rfl: 5    trihexyphenidyl (ARTANE) 2 MG tablet, Take 0.5 tablets by mouth Daily., Disp: , Rfl:     Allergies:   No Known Allergies    PHQ-9 Total Score:     STEADI Fall Risk Assessment has not been completed.    Objective     Physical Exam:   Physical Exam  Vitals reviewed.   Neurological:      Mental Status: He is oriented to person, place, and time.      Cranial Nerves: Cranial nerves 2-12 are intact.      Gait: Gait is intact.   Psychiatric:         Speech: Speech normal.         Neurologic Exam     Mental Status   Oriented to person, place, and time.   Attention: normal. Concentration: normal.   Speech: speech is normal   Level of consciousness: alert  Knowledge: good.     Cranial Nerves   Cranial nerves II through XII intact.     Motor Exam     Strength   Right neck flexion: 5/5  Left neck flexion: 5/5  Right neck extension: 5/5  Left neck extension: 5/5  Right deltoid: 5/5  Left deltoid: 5/5  Right biceps: 5/5  Left biceps: 5/5  Right triceps: 5/5  Left triceps:  "5/5  Right wrist flexion: 5/5  Left wrist flexion: 5/5  Right wrist extension: 5/5  Left wrist extension: 5/5  Right interossei: 5/5  Left interossei: 5/5  Right abdominals: 5/5  Left abdominals: 5/5  Right iliopsoas: 5/5  Left iliopsoas: 5/5  Right quadriceps: 5/5  Left quadriceps: 5/5  Right hamstrin/5  Left hamstrin/5  Right glutei: 5/5  Left glutei: 5/5  Right anterior tibial: 5/5  Left anterior tibial: 5/5  Right posterior tibial: 5/5  Left posterior tibial: 5/5  Right peroneal: 5/5  Left peroneal: 5/5  Right gastroc: 5/5  Left gastroc: 5/5    Sensory Exam   Light touch normal.     Gait, Coordination, and Reflexes     Gait  Gait: normal    Tremor   Resting tremor: present  Intention tremor: absent  Action tremor: absent       Vital Signs:   Vitals:    23 1449   BP: 126/80   Pulse: 100   SpO2: 99%   Weight: 84.6 kg (186 lb 9.6 oz)   Height: 185.4 cm (72.99\")     Body mass index is 24.62 kg/m².     Results:   Imaging:   No Images in the past 120 days found..     Labs:    No results found for: \"CMP\", \"PROTEIN\", \"ANTIMOGAB\", \"CONKUU1AOWC\", \"JCVRESULT\", \"QUANTTBGOLD\", \"CBCDIF\", \"IGGALBSER\"     Assessment / Plan      Assessment/Plan:   Diagnoses and all orders for this visit:    1. Parkinson's disease  Comments:  Cont Sinemet and Mirapex  Orders:  -     pramipexole (MIRAPEX) 1.5 MG tablet; Take 0.5 tablets by mouth 3 (Three) Times a Day.  Dispense: 135 tablet; Refill: 3    Other orders  -     carbidopa-levodopa CR (SINEMET CR)  MG per CR tablet; Take 1 tablet by mouth 4 (Four) Times a Day.  Dispense: 360 tablet; Refill: 3           Patient Education:     Reviewed medications, potential side effects and signs and symptoms to report. Discussed risk versus benefits of treatment plan with patient and/or family-including medications, labs and radiology that may be ordered. Addressed questions and concerns during visit. Patient and/or family verbalized understanding and agree with plan. Instructed to " call the office with any questions and report to ER with any life-threatening symptoms.     Follow Up:   Return in about 6 months (around 6/4/2024).    I spent 30  minutes face to face with the patient. I personally spent 50 percent of this time counseling and discussing diagnosis, diagnostic testing, driving, treatment options, and management .       During this visit the following were done:  Labs Reviewed []    Labs Ordered []    Radiology Reports Reviewed []    Radiology Ordered []    PCP Records Reviewed []    Referring Provider Records Reviewed []    ER Records Reviewed []    Hospital Records Reviewed []    History Obtained From Family []    Radiology Images Reviewed []    Other Reviewed []    Records Requested []      JOSE L Holcomb  [facility] NEUROLOGY  [address] [address] [phone]

## 2024-06-04 ENCOUNTER — OFFICE VISIT (OUTPATIENT)
Dept: NEUROLOGY | Facility: CLINIC | Age: 54
End: 2024-06-04
Payer: COMMERCIAL

## 2024-06-04 VITALS
HEART RATE: 82 BPM | WEIGHT: 175 LBS | DIASTOLIC BLOOD PRESSURE: 98 MMHG | HEIGHT: 73 IN | SYSTOLIC BLOOD PRESSURE: 150 MMHG | OXYGEN SATURATION: 96 % | BODY MASS INDEX: 23.19 KG/M2

## 2024-06-04 DIAGNOSIS — G20.A1 PARKINSON'S DISEASE: ICD-10-CM

## 2024-06-04 DIAGNOSIS — R63.4 UNINTENTIONAL WEIGHT LOSS: ICD-10-CM

## 2024-06-04 DIAGNOSIS — R11.0 NAUSEA: Primary | ICD-10-CM

## 2024-06-04 PROCEDURE — 99214 OFFICE O/P EST MOD 30 MIN: CPT | Performed by: NURSE PRACTITIONER

## 2024-06-04 RX ORDER — PRAMIPEXOLE DIHYDROCHLORIDE 1.5 MG/1
0.75 TABLET ORAL 3 TIMES DAILY
Qty: 135 TABLET | Refills: 3 | Status: SHIPPED | OUTPATIENT
Start: 2024-06-04 | End: 2025-06-04

## 2024-06-04 RX ORDER — CARBIDOPA AND LEVODOPA 50; 200 MG/1; MG/1
1 TABLET, EXTENDED RELEASE ORAL 4 TIMES DAILY
Qty: 360 TABLET | Refills: 3 | Status: SHIPPED | OUTPATIENT
Start: 2024-06-04 | End: 2025-06-04

## 2024-06-04 RX ORDER — ONDANSETRON 8 MG/1
8 TABLET, ORALLY DISINTEGRATING ORAL EVERY 8 HOURS PRN
Qty: 27 TABLET | Refills: 2 | Status: SHIPPED | OUTPATIENT
Start: 2024-06-04 | End: 2025-06-04

## 2024-06-04 NOTE — PROGRESS NOTES
Neuro Office Visit      Encounter Date: 2024   Patient Name: Jose Luis Mandujano  : 1970   MRN: [id number]   PCP:  Major Santana DO     Chief Complaint:    Chief Complaint   Patient presents with    Parkinson's Disease     F/U       History of Present Illness: Jose Luis Mandujano is a 53 y.o. male who is here today in Neurology for Parkinson's disease.  History of Present Illness  Continues his regimen of carbidopa/levodopa, administered four times daily, and Mirapex, administered thrice daily.     Reports no new allergies, diagnoses, or surgical interventions.     Parkinson's disease symptoms remain consistent, with occasional hand tremors and foot dragging.     He acknowledges the efficacy of carbidopa/levodopa in managing his symptoms, however, it tends to diminish prior to his next dose unless he adheres to a strict schedule.   Tremors are intermittent, even while on medication, with the left side being more affected than the right.     Denies dropping items or using utensils, but admits to occasional hand tremors during meals.     Sleep pattern is generally good, with a habit of waking up at 2:00 AM and staying awake until 4:00 AM watching television, before resuming sleep.     Denies any falls, but admits to occasional dizziness upon standing quickly while working on his .    Persistent weight loss, despite maintaining a consistent diet. He attributes this weight loss to increased physical activity.    Weight today is 175 pounds and he weighed 193 pounds in .    Occasionally experiences nausea and occasional diarrhea.     He attempts to maintain his caloric intake, which he believes has slightly affected his appetite. He has not yet tried Zofran. He lives alone and does not cook much. His ex-wife provides him with protein shakes, which he tolerates well. 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      Subjective      Past Medical History:   Past Medical  History:   Diagnosis Date    Moderate episode of recurrent major depressive disorder 1/6/2017    Formatting of this note might be different from the original. Last Assessment & Plan:  Psychological condition is improving with treatment. Continue current treatment regimen. Psychological condition  will be reassessed at the next regular appointment.    Parkinson's disease 12/15/2016    Formatting of this note might be different from the original. Last Assessment & Plan:  Sx stable on Sinemet, Artane, Mirapex       Past Surgical History: History reviewed. No pertinent surgical history.    Family History:   Family History   Problem Relation Age of Onset    Heart disease Father         cardiac disorder    Diabetes Father     Hypertension Father     Alcohol abuse Brother     Mental illness Other     Stroke Other        Social History:   Social History     Socioeconomic History    Marital status:    Tobacco Use    Smoking status: Former    Smokeless tobacco: Never   Vaping Use    Vaping status: Never Used   Substance and Sexual Activity    Alcohol use: No    Drug use: No    Sexual activity: Defer       Medications:     Current Outpatient Medications:     carbidopa-levodopa CR (SINEMET CR)  MG per CR tablet, Take 1 tablet by mouth 4 (Four) Times a Day., Disp: 360 tablet, Rfl: 3    pramipexole (MIRAPEX) 1.5 MG tablet, Take 0.5 tablets by mouth 3 (Three) Times a Day., Disp: 135 tablet, Rfl: 3    sertraline (ZOLOFT) 100 MG tablet, Take 0.5 tablets by mouth Daily., Disp: 45 tablet, Rfl: 5    ondansetron ODT (ZOFRAN-ODT) 8 MG disintegrating tablet, Place 1 tablet on the tongue Every 8 (Eight) Hours As Needed for Nausea or Vomiting., Disp: 27 tablet, Rfl: 2    trihexyphenidyl (ARTANE) 2 MG tablet, Take 0.5 tablets by mouth Daily. (Patient not taking: Reported on 6/4/2024), Disp: , Rfl:     Allergies:   No Known Allergies    PHQ-9 Total Score:     STEADI Fall Risk Assessment has not been completed.    Objective      Physical Exam:     Neurologic Exam     Mental Status   Oriented to person, place, and time.   Attention: normal.   Speech: speech is normal   Level of consciousness: alert  Knowledge: good.     Cranial Nerves     CN II   Visual fields full to confrontation.     CN III, IV, VI   Pupils are equal, round, and reactive to light.  Extraocular motions are normal.   CN III: no CN III palsy  CN VI: no CN VI palsy    CN V   Facial sensation intact.     CN VII   Facial expression full, symmetric.     CN VIII   CN VIII normal.     CN IX, X   CN IX normal.   CN X normal.     CN XI   CN XI normal.     CN XII   CN XII normal.     Motor Exam   Muscle bulk: normal  Overall muscle tone: normal    Strength   Right neck flexion: 5/5  Left neck flexion: 5/5  Right neck extension: 5/5  Left neck extension: 5/5  Right deltoid: 5/5  Left deltoid: 5/5  Right biceps: 5/5  Left biceps: 5/5  Right triceps: 5/5  Left triceps: 5/5  Right wrist flexion: 5/5  Left wrist flexion: 5/5  Right wrist extension: 5/5  Left wrist extension: 5/5  Right interossei: 5/5  Left interossei: 5/5  Right abdominals: 5/5  Left abdominals: 5/5  Right iliopsoas: 5/5  Left iliopsoas: 5/5  Right quadriceps: 5/5  Left quadriceps: 5/5  Right hamstrin/5  Left hamstrin/5  Right glutei: 5/5  Left glutei: 5/5  Right anterior tibial: 5/5  Left anterior tibial: 5/5  Right posterior tibial: 5/5  Left posterior tibial: 5/5  Right peroneal: 5/5  Left peroneal: 5/5  Right gastroc: 5/5  Left gastroc: 5/5    Sensory Exam   Light touch normal.     Gait, Coordination, and Reflexes     Gait  Gait: normal    Coordination   Romberg: negative  Finger to nose coordination: normal  Heel to shin coordination: normal  Tandem walking coordination: normal    Tremor   Resting tremor: present  Intention tremor: absent  Action tremor: absent    Reflexes   Right brachioradialis: 2+  Left brachioradialis: 2+  Right biceps: 2+  Left biceps: 2+  Right triceps: 2+  Left triceps: 2+  Right  "patellar: 2+  Left patellar: 2+  Right achilles: 2+  Left achilles: 2+  Right : 2+  Left : 2+       Vital Signs:   Vitals:    06/04/24 1411   BP: 150/98   Pulse: 82   SpO2: 96%   Weight: 79.4 kg (175 lb)   Height: 185.4 cm (72.99\")     Body mass index is 23.09 kg/m².     Results:   Results       Imaging:   No Images in the past 120 days found..     Labs:   No results found for: \"CMP\", \"PROTEIN\", \"ANTIMOGAB\", \"BZOPGH1YOCA\", \"JCVRESULT\", \"QUANTTBGOLD\", \"CBCDIF\", \"IGGALBSER\"     Assessment / Plan      Assessment/Plan:   Diagnoses and all orders for this visit:    1. Nausea (Primary)  Comments:  Zofran as needed    2. Parkinson's disease  Comments:  Cont Sinemet and Mirapex  Orders:  -     pramipexole (MIRAPEX) 1.5 MG tablet; Take 0.5 tablets by mouth 3 (Three) Times a Day.  Dispense: 135 tablet; Refill: 3    3. Unintentional weight loss    Other orders  -     ondansetron ODT (ZOFRAN-ODT) 8 MG disintegrating tablet; Place 1 tablet on the tongue Every 8 (Eight) Hours As Needed for Nausea or Vomiting.  Dispense: 27 tablet; Refill: 2  -     carbidopa-levodopa CR (SINEMET CR)  MG per CR tablet; Take 1 tablet by mouth 4 (Four) Times a Day.  Dispense: 360 tablet; Refill: 3           Patient Education:     Reviewed medications, potential side effects and signs and symptoms to report. Discussed risk versus benefits of treatment plan with patient and/or family-including medications, labs and radiology that may be ordered. Addressed questions and concerns during visit. Patient and/or family verbalized understanding and agree with plan. Instructed to call the office with any questions and report to ER with any life-threatening symptoms.     Follow Up:   Return in about 6 months (around 12/4/2024).    I spent 30 minutes caring for Jose Luis on this date of service. This time includes time spent by me in the following activities: preparing for the visit, obtaining and/or reviewing a separately obtained history, " performing a medically appropriate examination and/or evaluation, counseling and educating the patient/family/caregiver, and ordering medications, tests, or procedures.        During this visit the following were done:  Labs Reviewed []    Labs Ordered []    Radiology Reports Reviewed []    Radiology Ordered []    PCP Records Reviewed []    Referring Provider Records Reviewed []    ER Records Reviewed []    Hospital Records Reviewed []    History Obtained From Family []    Radiology Images Reviewed []    Other Reviewed []    Records Requested []      Patient or patient representative verbalized consent for the use of Ambient Listening during the visit with  JOSE L Holcomb for chart documentation. 6/4/2024  15:57 EDT    JOSE L Holcomb   [facility] [place] [facility] NEUROLOGY  [address] [address] [phone]

## 2024-11-01 ENCOUNTER — TELEPHONE (OUTPATIENT)
Dept: NEUROLOGY | Facility: CLINIC | Age: 54
End: 2024-11-01
Payer: COMMERCIAL

## 2024-11-01 NOTE — TELEPHONE ENCOUNTER
Spoke with the Pt to reschedule their 12/24/24 appt due to the Provider being out of the office for unforeseen circumstances.    Pt has been rescheduled for 1/2/24.     Pt verbalized understanding.

## 2025-01-02 ENCOUNTER — OFFICE VISIT (OUTPATIENT)
Dept: NEUROLOGY | Facility: CLINIC | Age: 55
End: 2025-01-02
Payer: COMMERCIAL

## 2025-01-02 VITALS
OXYGEN SATURATION: 97 % | HEART RATE: 82 BPM | WEIGHT: 184 LBS | HEIGHT: 73 IN | DIASTOLIC BLOOD PRESSURE: 88 MMHG | SYSTOLIC BLOOD PRESSURE: 118 MMHG | BODY MASS INDEX: 24.39 KG/M2

## 2025-01-02 DIAGNOSIS — G20.A1 PARKINSON'S DISEASE WITHOUT DYSKINESIA OR FLUCTUATING MANIFESTATIONS: Primary | ICD-10-CM

## 2025-01-02 DIAGNOSIS — M21.372 FOOT DROP, LEFT FOOT: ICD-10-CM

## 2025-01-02 DIAGNOSIS — G20.A1 PARKINSON'S DISEASE: ICD-10-CM

## 2025-01-02 PROCEDURE — 99214 OFFICE O/P EST MOD 30 MIN: CPT | Performed by: NURSE PRACTITIONER

## 2025-01-02 RX ORDER — SUCRALFATE 1 G/1
1 TABLET ORAL 2 TIMES DAILY
COMMUNITY
Start: 2024-11-04

## 2025-01-02 RX ORDER — PRAMIPEXOLE DIHYDROCHLORIDE 1.5 MG/1
0.75 TABLET ORAL 3 TIMES DAILY
Qty: 135 TABLET | Refills: 3 | Status: SHIPPED | OUTPATIENT
Start: 2025-01-02 | End: 2026-01-02

## 2025-01-02 RX ORDER — CARBIDOPA AND LEVODOPA 50; 200 MG/1; MG/1
1 TABLET, EXTENDED RELEASE ORAL 4 TIMES DAILY
Qty: 360 TABLET | Refills: 3 | Status: SHIPPED | OUTPATIENT
Start: 2025-01-02 | End: 2026-01-02

## 2025-01-02 RX ORDER — OMEPRAZOLE 40 MG/1
40 CAPSULE, DELAYED RELEASE ORAL DAILY
COMMUNITY

## 2025-01-02 NOTE — PROGRESS NOTES
Neuro Office Visit      Encounter Date: 2025   Patient Name: Jose Luis Mandujano  : 1970   MRN: [id number]   PCP:  Major Santana DO     Chief Complaint:    Chief Complaint   Patient presents with    Parkinson's Disease       History of Present Illness: Jose Luis Mandujano is a 54 y.o. male who is here today in Neurology for Parkinson's disease.    Last visit with me on  continued carbidopa-levodopa, Mirapex.  History of Present Illness  He has been experiencing a limp, which prompted a visit to his primary care physician. During this visit, his significant weight loss was also noted. He was diagnosed with drop foot but has not sought further treatment for this condition.     Initially, he experienced difficulty in lifting his foot, but this symptom has since improved after he began performing stretching exercises.. He has not undergone any physical therapy or used a boot for this condition. He resides in [place] and is familiar with a physical therapy facility in the area.     He reports that his tremors are intermittent and have remained consistent. He expresses concern about the frequency of his tremors but is hesitant to increase his medication dosage due to potential side effects.     He has been identified as a candidate for deep brain stimulation but is currently reluctant to undergo the procedure.     He has not required frequent use of Zofran or Advil.     He is currently on a regimen of carbidopa-levodopa 4 times daily and Mirapex 3 times daily.    He has undergone both a colonoscopy and an upper GI endoscopy since his last visit, with no significant findings reported. He occasionally experiences severe heartburn. He does not recall any specific feedback from the procedures, other than being provided with some paperwork and images. He reports no presence of polyps. He is currently on omeprazole and sucralfate, which appear to be effective.    MEDICATIONS  Current: Omeprazole,  sucralfate, carbidopa-levodopa, Mirapex, Zofran, Advil.      Subjective      Past Medical History:   Past Medical History:   Diagnosis Date    Moderate episode of recurrent major depressive disorder 1/6/2017    Formatting of this note might be different from the original. Last Assessment & Plan:  Psychological condition is improving with treatment. Continue current treatment regimen. Psychological condition  will be reassessed at the next regular appointment.    Parkinson's disease 12/15/2016    Formatting of this note might be different from the original. Last Assessment & Plan:  Sx stable on Sinemet, Artane, Mirapex       Past Surgical History:   Past Surgical History:   Procedure Laterality Date    COLONOSCOPY      FL ESOPHAGRAM UPPER GI W AIR         Family History:   Family History   Problem Relation Age of Onset    Heart disease Father         cardiac disorder    Diabetes Father     Hypertension Father     Alcohol abuse Brother     Mental illness Other     Stroke Other        Social History:   Social History     Socioeconomic History    Marital status:    Tobacco Use    Smoking status: Former    Smokeless tobacco: Never   Vaping Use    Vaping status: Never Used   Substance and Sexual Activity    Alcohol use: No    Drug use: No    Sexual activity: Defer       Medications:     Current Outpatient Medications:     carbidopa-levodopa CR (SINEMET CR)  MG per CR tablet, Take 1 tablet by mouth 4 (Four) Times a Day., Disp: 360 tablet, Rfl: 3    omeprazole (priLOSEC) 40 MG capsule, Take 1 capsule by mouth Daily., Disp: , Rfl:     ondansetron ODT (ZOFRAN-ODT) 8 MG disintegrating tablet, Place 1 tablet on the tongue Every 8 (Eight) Hours As Needed for Nausea or Vomiting., Disp: 27 tablet, Rfl: 2    pramipexole (MIRAPEX) 1.5 MG tablet, Take 0.5 tablets by mouth 3 (Three) Times a Day., Disp: 135 tablet, Rfl: 3    sucralfate (CARAFATE) 1 g tablet, Take 1 tablet by mouth 2 (Two) Times a Day., Disp: , Rfl:  "    Allergies:   No Known Allergies    PHQ-9 Total Score:     Pending sale to [facility] Fall Risk Assessment has not been completed.    Objective     Physical Exam:     Neurological Exam  Mental Status  Awake, alert and oriented to person, place and time. Recent and remote memory are intact. Speech is normal. Language is fluent with no aphasia. Attention and concentration are normal. Fund of knowledge is appropriate for level of education.    Cranial Nerves  CN II: Visual acuity is normal.  CN III, IV, VI: Extraocular movements intact bilaterally. Pupils equal round and reactive to light bilaterally.  CN V: Facial sensation is normal.  CN VII: Full and symmetric facial movement.  CN IX, X: Palate elevates symmetrically  CN XI: Shoulder shrug strength is normal.  CN XII: Tongue midline without atrophy or fasciculations.    Motor  Normal muscle bulk throughout. No fasciculations present. Normal muscle tone. Strength is 5/5 throughout all four extremities.    Sensory  Sensation is intact to light touch, pinprick, vibration and proprioception in all four extremities.    Reflexes                                            Right                      Left  Brachioradialis                    2+                         2+  Biceps                                 2+                         2+  Triceps                                2+                         2+  Finger flex                           2+                         2+  Hamstring                            2+                         2+  Patellar                                2+                         2+  Achilles                                2+                         2+    Coordination    Finger-to-nose, rapid alternating movements and heel-to-shin normal bilaterally without dysmetria.    Gait  Normal casual, toe, heel and tandem gait.        Vital Signs:   Vitals:    01/02/25 1449   BP: 118/88   Pulse: 82   SpO2: 97%   Weight: 83.5 kg (184 lb)   Height: 185.4 cm (72.99\")     Body " "mass index is 24.28 kg/m².     Results:   Results  Imaging  Colonoscopy and upper GI were normal.     Imaging:   No Images in the past 120 days found..     Labs:   No results found for: \"CMP\", \"PROTEIN\", \"ANTIMOGAB\", \"[id number]\", \"JCVRESULT\", \"QUANTTBGOLD\", \"CBCDIF\", \"IGGALBSER\"     Assessment / Plan      Assessment/Plan:   There are no diagnoses linked to this encounter.     Assessment & Plan  1. Drop foot.  The condition is not associated with his current medication regimen. It could potentially be a result of an injury or stroke. A referral for outpatient physical therapy at [facility] will be initiated. He is advised to contact the office if he does not receive any communication regarding the referral by the end of next week.    2. Tremors.  He will maintain his current medication regimen, which includes carbidopa-levodopa 4 times a day and Mirapex 3 times a day. Prescriptions for Zofran and Advil have been renewed and sent to [facility] in [place]. If he experiences any changes in his condition, he is advised to contact the office.    3. Heartburn.  He is currently on omeprazole and sucralfate, which appear to be effective.    Follow-up  The patient is scheduled for a follow-up visit on 07/02/2025.    PROCEDURE  The patient has undergone both a colonoscopy and an upper GI endoscopy since his last visit, with no significant findings reported.    Patient Education:     Reviewed medications, potential side effects and signs and symptoms to report. Discussed risk versus benefits of treatment plan with patient and/or family-including medications, labs and radiology that may be ordered. Addressed questions and concerns during visit. Patient and/or family verbalized understanding and agree with plan. Instructed to call the office with any questions and report to ER with any life-threatening symptoms.     Follow Up:   No follow-ups on file.    I spent 40 minutes caring for Jose Luis on " this date of service. This time includes time spent by me in the following activities: preparing for the visit, performing a medically appropriate examination and/or evaluation, counseling and educating the patient/family/caregiver, and documenting information in the medical record.        During this visit the following were done:  Labs Reviewed []    Labs Ordered []    Radiology Reports Reviewed []    Radiology Ordered []    PCP Records Reviewed []    Referring Provider Records Reviewed []    ER Records Reviewed []    Hospital Records Reviewed []    History Obtained From Family []    Radiology Images Reviewed []    Other Reviewed []    Records Requested []      Patient or patient representative verbalized consent for the use of Ambient Listening during the visit with  JOSE L Holcomb for chart documentation. 1/2/2025  16:30 EST    JOSE L Holcomb   [facility]  [address] [place] [address]  [facility] [address]  [phone]

## 2025-07-02 ENCOUNTER — OFFICE VISIT (OUTPATIENT)
Dept: NEUROLOGY | Facility: CLINIC | Age: 55
End: 2025-07-02
Payer: COMMERCIAL

## 2025-07-02 VITALS
WEIGHT: 195.4 LBS | DIASTOLIC BLOOD PRESSURE: 80 MMHG | BODY MASS INDEX: 25.9 KG/M2 | OXYGEN SATURATION: 99 % | SYSTOLIC BLOOD PRESSURE: 128 MMHG | HEIGHT: 73 IN | HEART RATE: 89 BPM

## 2025-07-02 DIAGNOSIS — G20.A1 PARKINSON'S DISEASE: ICD-10-CM

## 2025-07-02 DIAGNOSIS — M21.372 FOOT DROP, LEFT FOOT: ICD-10-CM

## 2025-07-02 DIAGNOSIS — G20.A1 PARKINSON'S DISEASE WITHOUT DYSKINESIA OR FLUCTUATING MANIFESTATIONS: Primary | ICD-10-CM

## 2025-07-02 PROCEDURE — 99213 OFFICE O/P EST LOW 20 MIN: CPT | Performed by: NURSE PRACTITIONER

## 2025-07-02 RX ORDER — PRAMIPEXOLE DIHYDROCHLORIDE 1.5 MG/1
0.75 TABLET ORAL 3 TIMES DAILY
Qty: 135 TABLET | Refills: 3 | Status: SHIPPED | OUTPATIENT
Start: 2025-07-02 | End: 2026-07-02

## 2025-07-02 RX ORDER — CARBIDOPA AND LEVODOPA 50; 200 MG/1; MG/1
1 TABLET, EXTENDED RELEASE ORAL 4 TIMES DAILY
Qty: 360 TABLET | Refills: 3 | Status: SHIPPED | OUTPATIENT
Start: 2025-07-02 | End: 2026-07-02

## 2025-07-02 NOTE — PROGRESS NOTES
Neuro Office Visit      Encounter Date: 2025   Patient Name: Jose Luis Mandujano  : 1970   MRN: [id number]   PCP:  Major Santana DO     Chief Complaint:    Chief Complaint   Patient presents with    Parkinson's disease without dyskinesia or fluctuating manif       History of Present Illness: Jose Luis Mandujano is a 55 y.o. male who is here today in Neurology for Parkinson's disease.    Last visit with me on 2025, continued Mirapex and carbidopa-levodopa, referral for physical therapy.  History of Present Illness  He has been managing his foot condition independently, without the aid of physical therapy, and reports an improvement in his symptoms.     He has not experienced any falls. His current medication regimen includes carbidopa and Mirapex, which he tolerates well without any side effects.     However, he occasionally needs to take 1.5 doses of carbidopa in the morning to alleviate stiffness before starting his day.     This need is becoming more frequent. He typically takes carbidopa 4 times daily but sometimes skips the last dose if he falls asleep early and that is when he notices increased stiffness in the morning.     He experiences weakness in his left hand when the medication's effects begin to wane, but as long as he is taking the medicine regularly, he does not have any issues.     He exercises caution when climbing stairs due to leg weakness but finds support from a railing helpful.     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     He finds it challenging to rise from a kneeling position and often experiences lower back pain, which he attributes to his work.     He also reports generalized muscle soreness.    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      Initial eval with left hand tremor for several months starting in late 2014.  Tremor  spread to left leg and developing left sided limp.      Slowing movements of left arm and leg.       Reviewed evaluation by Dr Kit Kunz on 9/6/16     Action tremor in left hand with onset 4 years ago.      Noticed at first with smoking a cigarette.       Started on primidone on 9/6/16.  Stopped due to CNS side effects.         Subjective      Past Medical History:   Past Medical History:   Diagnosis Date    Moderate episode of recurrent major depressive disorder 1/6/2017    Formatting of this note might be different from the original. Last Assessment & Plan:  Psychological condition is improving with treatment. Continue current treatment regimen. Psychological condition  will be reassessed at the next regular appointment.    Parkinson's disease 12/15/2016    Formatting of this note might be different from the original. Last Assessment & Plan:  Sx stable on Sinemet, Artane, Mirapex       Past Surgical History:   Past Surgical History:   Procedure Laterality Date    COLONOSCOPY      FL ESOPHAGRAM UPPER GI W AIR         Family History:   Family History   Problem Relation Age of Onset    Heart disease Father         cardiac disorder    Diabetes Father     Hypertension Father     Alcohol abuse Brother     Mental illness Other     Stroke Other        Social History:   Social History     Socioeconomic History    Marital status:    Tobacco Use    Smoking status: Former    Smokeless tobacco: Never   Vaping Use    Vaping status: Never Used   Substance and Sexual Activity    Alcohol use: No    Drug use: No    Sexual activity: Defer       Medications:     Current Outpatient Medications:     carbidopa-levodopa CR (SINEMET CR)  MG per CR tablet, Take 1 tablet by mouth 4 (Four) Times a Day., Disp: 360 tablet, Rfl: 3    pramipexole (MIRAPEX) 1.5 MG tablet, Take 0.5 tablets by  mouth 3 (Three) Times a Day., Disp: 135 tablet, Rfl: 3    Allergies:   No Known Allergies    PHQ-9 Total Score:     [facility] Fall Risk Assessment has not been completed.    Objective     Physical Exam:     Neurological Exam  Mental Status  Awake, alert and oriented to person, place and time. Recent and remote memory are intact. Speech is normal. Language is fluent with no aphasia. Attention and concentration are normal. Fund of knowledge is appropriate for level of education.    Cranial Nerves  CN II: Visual acuity is normal.  CN III, IV, VI: Extraocular movements intact bilaterally. Pupils equal round and reactive to light bilaterally.  CN V: Facial sensation is normal.  CN VII: Full and symmetric facial movement.  CN IX, X: Palate elevates symmetrically  CN XI: Shoulder shrug strength is normal.  CN XII: Tongue midline without atrophy or fasciculations.    Motor  Normal muscle bulk throughout. No fasciculations present. Normal muscle tone. Strength is 5/5 throughout all four extremities.    Sensory  Sensation is intact to light touch, pinprick, vibration and proprioception in all four extremities.    Reflexes                                            Right                      Left  Brachioradialis                    2+                         2+  Biceps                                 2+                         2+  Triceps                                2+                         2+  Finger flex                           2+                         2+  Hamstring                            2+                         2+  Patellar                                2+                         2+  Achilles                                2+                         2+    Coordination    Finger-to-nose, rapid alternating movements and heel-to-shin normal bilaterally without dysmetria.    Gait  Normal casual, toe, heel and tandem gait.        Vital Signs:   Vitals:    07/02/25 1411   BP: 128/80   Pulse: 89   SpO2: 99%   Weight:  "88.6 kg (195 lb 6.4 oz)   Height: 185.4 cm (72.99\")     Body mass index is 25.79 kg/m².     Results:   Results       Imaging:   No Images in the past 120 days found..     Labs:   No results found for: \"CMP\", \"PROTEIN\", \"ANTIMOGAB\", \"[id number]\", \"JCVRESULT\", \"QUANTTBGOLD\", \"CBCDIF\", \"IGGALBSER\"     Assessment / Plan      Assessment/Plan:   Diagnoses and all orders for this visit:    1. Parkinson's disease without dyskinesia or fluctuating manifestations (Primary)  Comments:  Continue carbidopa-levodopa and Mirapex    2. Foot drop, left foot    3. Parkinson's disease  Comments:  Cont Sinemet and Mirapex  Orders:  -     pramipexole (MIRAPEX) 1.5 MG tablet; Take 0.5 tablets by mouth 3 (Three) Times a Day.  Dispense: 135 tablet; Refill: 3    Other orders  -     carbidopa-levodopa CR (SINEMET CR)  MG per CR tablet; Take 1 tablet by mouth 4 (Four) Times a Day.  Dispense: 360 tablet; Refill: 3         Assessment & Plan  1. Parkinson's Disease.  He reports no significant side effects from his current medications, carbidopa and Mirapex. However, he occasionally needs to take 1.5 doses of carbidopa in the mornings to manage symptoms. He experiences weakness in his left hand when the medication starts wearing off but notes that the medication is effective when taken regularly. He is advised to continue his current medication regimen. He is encouraged to explore YouTube videos from the [employer] for exercises that may help with mobility and muscle stiffness. A prescription refill for carbidopa and Mirapex has been provided for 1 year at [facility].    Patient Education:     Reviewed medications, potential side effects and signs and symptoms to report. Discussed risk versus benefits of treatment plan with patient and/or family-including medications, labs and radiology that may be ordered. Addressed questions and concerns during visit. Patient and/or family verbalized understanding and agree with " plan. Instructed to call the office with any questions and report to ER with any life-threatening symptoms.     Follow Up:   Return in about 6 months (around 1/2/2026).    I spent 30 minutes caring for Jose Luis on this date of service. This time includes time spent by me in the following activities: preparing for the visit, performing a medically appropriate examination and/or evaluation, counseling and educating the patient/family/caregiver, and documenting information in the medical record.        During this visit the following were done:  Labs Reviewed []    Labs Ordered []    Radiology Reports Reviewed []    Radiology Ordered []    PCP Records Reviewed []    Referring Provider Records Reviewed []    ER Records Reviewed []    Hospital Records Reviewed []    History Obtained From Family []    Radiology Images Reviewed []    Other Reviewed []    Records Requested []      Patient or patient representative verbalized consent for the use of Ambient Listening during the visit with  JOSE L Holcomb for chart documentation. 7/2/2025  14:39 EDT    JOSE L Holcomb   [facility] NEUROLOGY  [address] [place] [address]  [facility] [address] [phone]